# Patient Record
Sex: FEMALE | Race: WHITE | NOT HISPANIC OR LATINO | Employment: OTHER | URBAN - METROPOLITAN AREA
[De-identification: names, ages, dates, MRNs, and addresses within clinical notes are randomized per-mention and may not be internally consistent; named-entity substitution may affect disease eponyms.]

---

## 2023-02-23 LAB
ALANINE AMINOTRANSFERASE (SGPT) (U/L) IN SER/PLAS: 6 U/L (ref 7–45)
ALBUMIN (G/DL) IN SER/PLAS: 3.9 G/DL (ref 3.4–5)
ALKALINE PHOSPHATASE (U/L) IN SER/PLAS: 50 U/L (ref 33–110)
ANION GAP IN SER/PLAS: 10 MMOL/L (ref 10–20)
ASPARTATE AMINOTRANSFERASE (SGOT) (U/L) IN SER/PLAS: 9 U/L (ref 9–39)
BASOPHILS (10*3/UL) IN BLOOD BY AUTOMATED COUNT: 0.05 X10E9/L (ref 0–0.1)
BASOPHILS/100 LEUKOCYTES IN BLOOD BY AUTOMATED COUNT: 0.5 % (ref 0–2)
BILIRUBIN TOTAL (MG/DL) IN SER/PLAS: 0.3 MG/DL (ref 0–1.2)
CALCIDIOL (25 OH VITAMIN D3) (NG/ML) IN SER/PLAS: 11 NG/ML
CALCIUM (MG/DL) IN SER/PLAS: 9 MG/DL (ref 8.6–10.3)
CARBON DIOXIDE, TOTAL (MMOL/L) IN SER/PLAS: 26 MMOL/L (ref 21–32)
CHLORIDE (MMOL/L) IN SER/PLAS: 107 MMOL/L (ref 98–107)
CHOLESTEROL (MG/DL) IN SER/PLAS: 209 MG/DL (ref 0–199)
CHOLESTEROL IN HDL (MG/DL) IN SER/PLAS: 54.4 MG/DL
CHOLESTEROL/HDL RATIO: 3.8
COBALAMIN (VITAMIN B12) (PG/ML) IN SER/PLAS: 163 PG/ML (ref 211–911)
CREATININE (MG/DL) IN SER/PLAS: 0.83 MG/DL (ref 0.5–1.05)
EOSINOPHILS (10*3/UL) IN BLOOD BY AUTOMATED COUNT: 0.24 X10E9/L (ref 0–0.7)
EOSINOPHILS/100 LEUKOCYTES IN BLOOD BY AUTOMATED COUNT: 2.5 % (ref 0–6)
ERYTHROCYTE DISTRIBUTION WIDTH (RATIO) BY AUTOMATED COUNT: 14.2 % (ref 11.5–14.5)
ERYTHROCYTE MEAN CORPUSCULAR HEMOGLOBIN CONCENTRATION (G/DL) BY AUTOMATED: 31.2 G/DL (ref 32–36)
ERYTHROCYTE MEAN CORPUSCULAR VOLUME (FL) BY AUTOMATED COUNT: 92 FL (ref 80–100)
ERYTHROCYTES (10*6/UL) IN BLOOD BY AUTOMATED COUNT: 4.51 X10E12/L (ref 4–5.2)
GFR FEMALE: >90 ML/MIN/1.73M2
GLUCOSE (MG/DL) IN SER/PLAS: 65 MG/DL (ref 74–99)
HEMATOCRIT (%) IN BLOOD BY AUTOMATED COUNT: 41.3 % (ref 36–46)
HEMOGLOBIN (G/DL) IN BLOOD: 12.9 G/DL (ref 12–16)
IMMATURE GRANULOCYTES/100 LEUKOCYTES IN BLOOD BY AUTOMATED COUNT: 0.6 % (ref 0–0.9)
LDL: 132 MG/DL (ref 0–99)
LEUKOCYTES (10*3/UL) IN BLOOD BY AUTOMATED COUNT: 9.5 X10E9/L (ref 4.4–11.3)
LYMPHOCYTES (10*3/UL) IN BLOOD BY AUTOMATED COUNT: 2.62 X10E9/L (ref 1.2–4.8)
LYMPHOCYTES/100 LEUKOCYTES IN BLOOD BY AUTOMATED COUNT: 27.6 % (ref 13–44)
MONOCYTES (10*3/UL) IN BLOOD BY AUTOMATED COUNT: 0.54 X10E9/L (ref 0.1–1)
MONOCYTES/100 LEUKOCYTES IN BLOOD BY AUTOMATED COUNT: 5.7 % (ref 2–10)
NEUTROPHILS (10*3/UL) IN BLOOD BY AUTOMATED COUNT: 5.97 X10E9/L (ref 1.2–7.7)
NEUTROPHILS/100 LEUKOCYTES IN BLOOD BY AUTOMATED COUNT: 63.1 % (ref 40–80)
PLATELETS (10*3/UL) IN BLOOD AUTOMATED COUNT: 294 X10E9/L (ref 150–450)
POTASSIUM (MMOL/L) IN SER/PLAS: 4.5 MMOL/L (ref 3.5–5.3)
PROTEIN TOTAL: 6.7 G/DL (ref 6.4–8.2)
SODIUM (MMOL/L) IN SER/PLAS: 138 MMOL/L (ref 136–145)
THYROTROPIN (MIU/L) IN SER/PLAS BY DETECTION LIMIT <= 0.05 MIU/L: 1.48 MIU/L (ref 0.44–3.98)
TRIGLYCERIDE (MG/DL) IN SER/PLAS: 114 MG/DL (ref 0–149)
UREA NITROGEN (MG/DL) IN SER/PLAS: 13 MG/DL (ref 6–23)
VLDL: 23 MG/DL (ref 0–40)

## 2023-02-24 PROBLEM — G43.909 MIGRAINES: Status: ACTIVE | Noted: 2023-02-24

## 2023-02-24 PROBLEM — F31.9 BIPOLAR DEPRESSION (MULTI): Status: ACTIVE | Noted: 2023-02-24

## 2023-02-24 PROBLEM — S90.456A: Status: ACTIVE | Noted: 2023-02-24

## 2023-02-24 PROBLEM — F41.9 ANXIETY AND DEPRESSION: Status: ACTIVE | Noted: 2023-02-24

## 2023-02-24 PROBLEM — M25.561 RIGHT KNEE PAIN: Status: ACTIVE | Noted: 2023-02-24

## 2023-02-24 PROBLEM — M54.9 BACK PAIN: Status: ACTIVE | Noted: 2023-02-24

## 2023-02-24 PROBLEM — E53.8 VITAMIN B12 DEFICIENCY: Status: ACTIVE | Noted: 2023-02-24

## 2023-02-24 PROBLEM — G47.33 OBSTRUCTIVE SLEEP APNEA OF ADULT: Status: ACTIVE | Noted: 2023-02-24

## 2023-02-24 PROBLEM — E55.9 VITAMIN D DEFICIENCY: Status: ACTIVE | Noted: 2023-02-24

## 2023-02-24 PROBLEM — F32.A ANXIETY AND DEPRESSION: Status: ACTIVE | Noted: 2023-02-24

## 2023-02-24 PROBLEM — M79.604 RIGHT LEG PAIN: Status: ACTIVE | Noted: 2023-02-24

## 2023-02-24 LAB
ESTIMATED AVERAGE GLUCOSE FOR HBA1C: 105 MG/DL
HEMOGLOBIN A1C/HEMOGLOBIN TOTAL IN BLOOD: 5.3 %

## 2023-02-24 RX ORDER — MELOXICAM 15 MG/1
15 TABLET ORAL DAILY
COMMUNITY

## 2023-02-24 RX ORDER — CYCLOBENZAPRINE HCL 5 MG
5 TABLET ORAL 3 TIMES DAILY PRN
COMMUNITY
Start: 2016-04-01

## 2023-06-06 ENCOUNTER — APPOINTMENT (OUTPATIENT)
Dept: PRIMARY CARE | Facility: CLINIC | Age: 38
End: 2023-06-06
Payer: COMMERCIAL

## 2023-07-28 ENCOUNTER — APPOINTMENT (OUTPATIENT)
Dept: PRIMARY CARE | Facility: CLINIC | Age: 38
End: 2023-07-28
Payer: COMMERCIAL

## 2023-07-28 DIAGNOSIS — J32.9 RECURRENT SINUS INFECTIONS: Primary | ICD-10-CM

## 2023-10-05 ENCOUNTER — TELEMEDICINE (OUTPATIENT)
Dept: OTOLARYNGOLOGY | Facility: CLINIC | Age: 38
End: 2023-10-05
Payer: COMMERCIAL

## 2023-10-05 DIAGNOSIS — J34.2 NASAL SEPTAL DEVIATION: ICD-10-CM

## 2023-10-05 DIAGNOSIS — J34.89 NASAL OBSTRUCTION: ICD-10-CM

## 2023-10-05 DIAGNOSIS — J34.89 NASAL AND SINUS DISCHARGE: ICD-10-CM

## 2023-10-05 DIAGNOSIS — J32.4 CHRONIC PANSINUSITIS: Primary | ICD-10-CM

## 2023-10-05 PROCEDURE — 99213 OFFICE O/P EST LOW 20 MIN: CPT | Performed by: NURSE PRACTITIONER

## 2023-10-05 ASSESSMENT — PATIENT HEALTH QUESTIONNAIRE - PHQ9
SUM OF ALL RESPONSES TO PHQ9 QUESTIONS 1 AND 2: 0
1. LITTLE INTEREST OR PLEASURE IN DOING THINGS: NOT AT ALL
2. FEELING DOWN, DEPRESSED OR HOPELESS: NOT AT ALL

## 2023-10-05 NOTE — PATIENT INSTRUCTIONS
Today you were evaluated by Jeimy Chopra CNP.    Please follow-up as needed. If you have any questions or concerns, please contact my office at [phone].     Please follow-up directly with Dr. Cj Wu regarding surgery.

## 2023-10-05 NOTE — PROGRESS NOTES
Subjective   Patient ID: Shahla An is a 38 y.o. female who presents for follow-up visit.  HPI  38 year old female with symptoms of nasal obstruction, nasal drainage, anosmia and clinical findings consistent with chronic rhinosinusitis with nasal polyposis? She continues with purulence and large polypoid structures despite antibiotic therapy. Rec. additional antibiotic therapy, prednisone and imaging.  10/5/23- TELEHEALTH- Patient presents for follow-up. She notes that she had improvement to her symptoms while on the medication and felt that she could blow her nose. She is fully obstructed again since completion. She continues to use flonase and irrigations daily.     I personally reviewed the patients CT scan images and results. I discussed the results personally with the patient. The following findings were discussed: 9/28/23: CT Sinus: Left nasal septal deviation. Near complete opacification of the left maxillary sinus with obstruction of the left nasal cavity. Minimal mucosal thickening in the frontal sinuses. Ethmoid sinuses with minimal mucosal thickening. Near complete opacification of the left sphenoid sinus.    Review of Systems    Objective   Physical Exam  CONSTITUTIONAL: Patient appears well developed and well nourished.   GENERAL: this is a healthy appearing female who appears stated age. The patient is alert and appropriately verbally conversant without hoarseness. This patient is in no apparent distress.   FACE: The face was inspected and no cutaneous masses or lesions were visualized. There was no erythema or edema noted. Facial movement was symmetric.   EYES: Extra-ocular muscle function was intact. No nystagmus was observed. Pupils were equal.   NOSE: Examination of the nose revealed the nasal dorsum to be midline. NEUROLOGICAL: Patient is ambulatory without assist. Mentation is clear. Answering questions appropriately.   Assessment/Plan     ASSESSMENT:   38 year old female with symptoms  of nasal obstruction, nasal drainage, anosmia and clinical findings consistent with chronic rhinosinusitis with nasal polyposis? She continues with purulence and large polypoid structures despite antibiotic therapy. Rec. additional antibiotic therapy, prednisone and imaging.  10/5/23- TELEHEALTH- Surgical referral to Dr. Cj Wu.       PLAN:  1) I personally reviewed the patients CT scan images and results. I discussed the results personally with the patient. The following findings were discussed: 9/28/23: CT Sinus: Left nasal septal deviation. Near complete opacification of the left maxillary sinus with obstruction of the left nasal cavity. Minimal mucosal thickening in the frontal sinuses. Ethmoid sinuses with minimal mucosal thickening. Near complete opacification of the left sphenoid sinus.  Reassurance and counseling provided to patient and his condition was extensively discussed including as noted below:  2) Patient was instructed to continue steroid nasal spray.  3) The patient and myself had an extensive discussion regarding next steps for further management. We discussed endoscopic sinus surgery at length. We discussed implications for treatment. Patient referred for surgical management with Dr. Cj Wu. His office was contacted directly for scheduling.      11 minutes was spent on this patient's visit. More than 50% of that time was spent in counseling regarding the possible etiologies, test results, treatment options and coordinating care.
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
## 2023-12-06 NOTE — PATIENT INSTRUCTIONS
You are a good candidate for endoscopic sinus surgery, septoplasty, and bilateral inferior turbinate reductions. After your sinus surgery, you will need to do frequent saline irrigations. This will extremely important so that your sinuses stay open after surgery.    You were prescribed a 2-week course of doxycycline. Take it twice a day after breakfast and dinner.  In the middle of the day, take a daily probiotic or yogurt. Doxycycline can cause a skin sensitivity reaction with the sun. Avoid sun exposure while taking this medication or apply strong sunscreen (SPF-40 or higher) when in the sun. Please do not consume any dairy, calcium, or iron for 2 hours before or after taking this medication because this inactivates doxycycline. Common side effects from doxycycline include stomach upset and diarrhea. Take the antibiotic after meals to avoid these side effects. If you develop abdominal pain or diarrhea, please contact our office at [phone].    Please start a 12-day tapering course of prednisone. Take as directed. Please take the prednisone each day before noon. If you take the prednisone later in the day, it can cause difficulty sleeping.  The potential side effects of oral steroid use include but are not limited to: difficulty sleeping/insomnia, increased appetite, fluid retention, mood swings, weight gain, change in blood pressure, high blood glucose, possible adrenal suppression, osteoporosis, avascular necrosis of the hip, menstrual irregularities, glaucoma, and cataracts.    You have a top left tooth root that needs evaluation. I am concerned for possible dental related sources of infection (tooth root infection). Please see a dentist, endodontist, or a periodontist after your surgery.    PATIENT INFORMATION/INSTRUCTIONS PRIOR TO SINUS SURGERY: *Please Read Carefully*    Dr. Wu discussed the rationale for endoscopic sinus surgery, along with the benefits, risks, potential complications, and side  effects of the procedure with you today. If you have any questions about these subjects, please feel free to call our office at [phone].    You can expect after surgery to have increased symptoms and congestion for at least 1 week and sometimes upward of a month. Everyone's body reacts differently. You will have at least 2 visits with Dr. Wu for cleaning/debridement of the sinuses in the office after surgery, approximately 1 week and 1 month after surgery. This is necessary to ensure the sinuses heal well.    As described, your sinus surgery is only half the rey. Post operatively, it is VITAL that you continue the nasal irrigations and other medications directed by Dr. Wu. You will be given an instructional sheet post operatively that describes the expected disease course including nasal care. Dr. Wu will want you to start irrigations of the nose with saline post operatively. This is also important to keep the nose and sinuses open.    STOP TAKING THESE MEDICATIONS prior to surgery:  Aspirin - 10 to 14 days before surgery  Plavix/clopidogrel - 10 days before surgery  NSAIDs - 7 days before surgery (NSAIDs include painkillers like Motrin, Aleve, Naprosyn, Mobic, diclofenac, and ibuprofen)  Vitamin E - 10 to 14 days before surgery  Multivitamins with Vitamin E - 10 to 14 days before surgery  Herbal Medications/Diet Pills - 10 to 14 days before surgery    5.   Avoid NSAIDs for PAIN RELIEF. If needed, you could take Tylenol on the day of surgery with sips of water. You may also use opiates prescribed by your physician which includes medications like Percocet / MS Contin / Darvocet / Ultram.    6.   BLOOD THINNERS including Coumadin, Plavix, and Ticlid are to be stopped as directed by surgeon/cardiologist or as listed below.    7.   FOR PATIENTS WITH ASTHMA/COPD:  Use your inhalers as prescribed/as needed on the day of surgery. Bring your inhalers with you to the hospital. If you have Obstructive  Sleep Apnea and are on a CPAP/BiPAP machine, please bring it with you to the hospital.    8.   On the day of surgery, DO NOT wear jewelry, body piercings, makeup, nail polish, hairpins, or contacts. Leave all valuables and money with family members. If you have dentures, please leave these with family members.    9.   IF you are undergoing an OUTPATIENT procedure (Ambulatory Surgery - going home on the same day), you must have someone drive you home and stay with you for 24 hours after surgery. You cannot stay alone in a hotel room after outpatient surgery. Also, a  or  cannot be made a responsible caregiver. Your surgery may be cancelled if you do not have someone take care of you for 24 hours.    10.  You will be given a time to arrive the business day before surgery. If you do not hear about a time by 4:30 pm the business day before surgery, please call [phone] and ask for a time.    AFTER SURGERY, please observe the following precautions:  Refrain from blowing your nose for at least 2 weeks from the date of your surgery. Please do not stifle any sneezes. If you must sneeze, then try to sneeze through an open mouth. Please do not stick anything in your nose other than any medicine or irrigations we recommend. Please do not insert a Q-tip or finger in the nose after surgery as this can cause further trauma. Please refrain from any activities that will increase your heart rate or blood pressure for at least 2 weeks from the date of your surgery. Try to keep your head above your heart as much as possible. Please refrain from lifting objects greater than 10 lbs for at least 2 weeks from the date of your surgery.    Scribe Attestation  By signing my name below, I, Jia Murphy, attest that this documentation has been prepared under the direction and in the presence of Cj Wu MD. All medical record entries made by the Scribe were at my direction or personally dictated by me.  I have reviewed the chart and agree that the record accurately reflects my personal performance of the history, physical exam, discussion and plan.

## 2023-12-08 ENCOUNTER — OFFICE VISIT (OUTPATIENT)
Dept: OTOLARYNGOLOGY | Facility: CLINIC | Age: 38
End: 2023-12-08
Payer: COMMERCIAL

## 2023-12-08 VITALS — TEMPERATURE: 97.4 F | HEIGHT: 62 IN | BODY MASS INDEX: 53.07 KG/M2 | WEIGHT: 288.4 LBS

## 2023-12-08 DIAGNOSIS — J34.89 NASAL OBSTRUCTION: ICD-10-CM

## 2023-12-08 DIAGNOSIS — J32.4 CHRONIC PANSINUSITIS: ICD-10-CM

## 2023-12-08 DIAGNOSIS — J34.2 NASAL SEPTAL DEVIATION: ICD-10-CM

## 2023-12-08 DIAGNOSIS — J32.2 CHRONIC ETHMOIDAL SINUSITIS: ICD-10-CM

## 2023-12-08 DIAGNOSIS — R43.0 ANOSMIA: ICD-10-CM

## 2023-12-08 DIAGNOSIS — J32.0 CHRONIC MAXILLARY SINUSITIS: ICD-10-CM

## 2023-12-08 DIAGNOSIS — J32.3 CHRONIC SPHENOIDAL SINUSITIS: ICD-10-CM

## 2023-12-08 DIAGNOSIS — J34.89 NASAL AND SINUS DISCHARGE: ICD-10-CM

## 2023-12-08 DIAGNOSIS — J32.4 CHRONIC PANSINUSITIS: Primary | ICD-10-CM

## 2023-12-08 DIAGNOSIS — J34.3 HYPERTROPHY OF INFERIOR NASAL TURBINATE: ICD-10-CM

## 2023-12-08 PROCEDURE — 31231 NASAL ENDOSCOPY DX: CPT | Performed by: OTOLARYNGOLOGY

## 2023-12-08 PROCEDURE — 99214 OFFICE O/P EST MOD 30 MIN: CPT | Performed by: OTOLARYNGOLOGY

## 2023-12-08 RX ORDER — DOXYCYCLINE 100 MG/1
100 TABLET ORAL 2 TIMES DAILY
Qty: 20 TABLET | Refills: 0 | Status: SHIPPED | OUTPATIENT
Start: 2023-12-08 | End: 2023-12-18

## 2023-12-08 RX ORDER — PREDNISONE 10 MG/1
TABLET ORAL
Qty: 30 TABLET | Refills: 0 | Status: SHIPPED | OUTPATIENT
Start: 2023-12-08

## 2023-12-08 RX ORDER — FLUTICASONE PROPIONATE 50 MCG
2 SPRAY, SUSPENSION (ML) NASAL DAILY
COMMUNITY
Start: 2023-08-31

## 2023-12-08 ASSESSMENT — PATIENT HEALTH QUESTIONNAIRE - PHQ9
SUM OF ALL RESPONSES TO PHQ9 QUESTIONS 1 & 2: 0
2. FEELING DOWN, DEPRESSED OR HOPELESS: NOT AT ALL
1. LITTLE INTEREST OR PLEASURE IN DOING THINGS: NOT AT ALL
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
## 2023-12-15 DIAGNOSIS — J32.4 CHRONIC PANSINUSITIS: ICD-10-CM

## 2023-12-15 DIAGNOSIS — J34.2 DEVIATED NASAL SEPTUM: ICD-10-CM

## 2023-12-15 DIAGNOSIS — J34.89 NASAL AND SINUS DISCHARGE: ICD-10-CM

## 2023-12-15 DIAGNOSIS — J32.3 CHRONIC SPHENOIDAL SINUSITIS: ICD-10-CM

## 2023-12-15 DIAGNOSIS — J34.3 HYPERTROPHY OF NASAL TURBINATES: ICD-10-CM

## 2023-12-15 DIAGNOSIS — J32.0 CHRONIC MAXILLARY SINUSITIS: ICD-10-CM

## 2023-12-15 DIAGNOSIS — J32.2 CHRONIC ETHMOIDAL SINUSITIS: ICD-10-CM

## 2023-12-20 ENCOUNTER — APPOINTMENT (OUTPATIENT)
Dept: OTOLARYNGOLOGY | Facility: CLINIC | Age: 38
End: 2023-12-20
Payer: COMMERCIAL

## 2024-01-26 ENCOUNTER — APPOINTMENT (OUTPATIENT)
Dept: PREADMISSION TESTING | Facility: HOSPITAL | Age: 39
End: 2024-01-26
Payer: COMMERCIAL

## 2024-02-02 ENCOUNTER — APPOINTMENT (OUTPATIENT)
Dept: PREADMISSION TESTING | Facility: HOSPITAL | Age: 39
End: 2024-02-02
Payer: COMMERCIAL

## 2024-02-14 ENCOUNTER — PRE-ADMISSION TESTING (OUTPATIENT)
Dept: PREADMISSION TESTING | Facility: HOSPITAL | Age: 39
End: 2024-02-14
Payer: COMMERCIAL

## 2024-02-14 VITALS
BODY MASS INDEX: 53.18 KG/M2 | DIASTOLIC BLOOD PRESSURE: 84 MMHG | SYSTOLIC BLOOD PRESSURE: 146 MMHG | RESPIRATION RATE: 16 BRPM | HEIGHT: 62 IN | HEART RATE: 95 BPM | WEIGHT: 289 LBS | TEMPERATURE: 98.2 F

## 2024-02-14 DIAGNOSIS — J34.89 NASAL AND SINUS DISCHARGE: ICD-10-CM

## 2024-02-14 DIAGNOSIS — J32.3 CHRONIC SPHENOIDAL SINUSITIS: ICD-10-CM

## 2024-02-14 DIAGNOSIS — J34.3 HYPERTROPHY OF NASAL TURBINATES: ICD-10-CM

## 2024-02-14 DIAGNOSIS — J32.2 CHRONIC ETHMOIDAL SINUSITIS: ICD-10-CM

## 2024-02-14 DIAGNOSIS — J32.4 CHRONIC PANSINUSITIS: ICD-10-CM

## 2024-02-14 DIAGNOSIS — J34.2 DEVIATED NASAL SEPTUM: ICD-10-CM

## 2024-02-14 DIAGNOSIS — J32.0 CHRONIC MAXILLARY SINUSITIS: Primary | ICD-10-CM

## 2024-02-14 LAB
ABO GROUP (TYPE) IN BLOOD: NORMAL
ANION GAP SERPL CALC-SCNC: 12 MMOL/L (ref 10–20)
ANTIBODY SCREEN: NORMAL
BUN SERPL-MCNC: 14 MG/DL (ref 6–23)
CALCIUM SERPL-MCNC: 9.4 MG/DL (ref 8.6–10.6)
CHLORIDE SERPL-SCNC: 105 MMOL/L (ref 98–107)
CO2 SERPL-SCNC: 25 MMOL/L (ref 21–32)
CREAT SERPL-MCNC: 0.72 MG/DL (ref 0.5–1.05)
EGFRCR SERPLBLD CKD-EPI 2021: >90 ML/MIN/1.73M*2
ERYTHROCYTE [DISTWIDTH] IN BLOOD BY AUTOMATED COUNT: 14 % (ref 11.5–14.5)
GLUCOSE SERPL-MCNC: 89 MG/DL (ref 74–99)
HCT VFR BLD AUTO: 44.2 % (ref 36–46)
HGB BLD-MCNC: 14.6 G/DL (ref 12–16)
MCH RBC QN AUTO: 29 PG (ref 26–34)
MCHC RBC AUTO-ENTMCNC: 33 G/DL (ref 32–36)
MCV RBC AUTO: 88 FL (ref 80–100)
NRBC BLD-RTO: 0 /100 WBCS (ref 0–0)
PLATELET # BLD AUTO: 275 X10*3/UL (ref 150–450)
POTASSIUM SERPL-SCNC: 4.7 MMOL/L (ref 3.5–5.3)
RBC # BLD AUTO: 5.03 X10*6/UL (ref 4–5.2)
RH FACTOR (ANTIGEN D): NORMAL
SODIUM SERPL-SCNC: 137 MMOL/L (ref 136–145)
WBC # BLD AUTO: 10.2 X10*3/UL (ref 4.4–11.3)

## 2024-02-14 PROCEDURE — 36415 COLL VENOUS BLD VENIPUNCTURE: CPT

## 2024-02-14 PROCEDURE — 80048 BASIC METABOLIC PNL TOTAL CA: CPT

## 2024-02-14 PROCEDURE — 86901 BLOOD TYPING SEROLOGIC RH(D): CPT

## 2024-02-14 PROCEDURE — 99204 OFFICE O/P NEW MOD 45 MIN: CPT | Performed by: NURSE PRACTITIONER

## 2024-02-14 PROCEDURE — 85027 COMPLETE CBC AUTOMATED: CPT

## 2024-02-14 ASSESSMENT — ENCOUNTER SYMPTOMS
MUSCULOSKELETAL NEGATIVE: 1
CONSTITUTIONAL NEGATIVE: 1
NECK NEGATIVE: 1
NEUROLOGICAL NEGATIVE: 1
RESPIRATORY NEGATIVE: 1
SINUS CONGESTION: 1
GASTROINTESTINAL NEGATIVE: 1
RHINORRHEA: 1
CARDIOVASCULAR NEGATIVE: 1
ENDOCRINE NEGATIVE: 1

## 2024-02-14 ASSESSMENT — DUKE ACTIVITY SCORE INDEX (DASI)
CAN YOU PARTICIPATE IN MODERATE RECREATIONAL ACTIVITIES LIKE GOLF, BOWLING, DANCING, DOUBLES TENNIS OR THROWING A BASEBALL OR FOOTBALL: YES
CAN YOU PARTICIPATE IN STRENOUS SPORTS LIKE SWIMMING, SINGLES TENNIS, FOOTBALL, BASKETBALL, OR SKIING: YES
TOTAL_SCORE: 58.2
CAN YOU DO MODERATE WORK AROUND THE HOUSE LIKE VACUUMING, SWEEPING FLOORS OR CARRYING GROCERIES: YES
CAN YOU RUN A SHORT DISTANCE: YES
CAN YOU CLIMB A FLIGHT OF STAIRS OR WALK UP A HILL: YES
CAN YOU TAKE CARE OF YOURSELF (EAT, DRESS, BATHE, OR USE TOILET): YES
CAN YOU WALK A BLOCK OR TWO ON LEVEL GROUND: YES
CAN YOU WALK INDOORS, SUCH AS AROUND YOUR HOUSE: YES
CAN YOU DO HEAVY WORK AROUND THE HOUSE LIKE SCRUBBING FLOORS OR LIFTING AND MOVING HEAVY FURNITURE: YES
CAN YOU DO YARD WORK LIKE RAKING LEAVES, WEEDING OR PUSHING A MOWER: YES
CAN YOU DO LIGHT WORK AROUND THE HOUSE LIKE DUSTING OR WASHING DISHES: YES
CAN YOU HAVE SEXUAL RELATIONS: YES
DASI METS SCORE: 9.9

## 2024-02-14 ASSESSMENT — CHADS2 SCORE
DIABETES: NO
CHF: NO
AGE GREATER THAN OR EQUAL TO 75: NO
HYPERTENSION: NO
PRIOR STROKE OR TIA OR THROMBOEMBOLISM: YES
CHADS2 SCORE: 2

## 2024-02-14 ASSESSMENT — LIFESTYLE VARIABLES: SMOKING_STATUS: SMOKER

## 2024-02-14 NOTE — CPM/PAT H&P
CPM/PAT Evaluation       Name: Shahla An (Shahla An)  /Age: 1985/39 y.o.     Visit Type:   In-Person       Chief Complaint: Chronic maxillary sinusitis,  Chronic ethmoidal sinusitis, Chronic sphenoidal sinusitis,  Chronic pansinusitis, Deviated nasal septum, Hypertrophy of nasal turbinates, Nasal and sinus discharge   HPI  39-year-old female with a past medical history significant for morbid obesity, today noncompliant with CPAP, childhood asthma, hearing loss, nephrolithiasis, TIA, anxiety, depression, bipolar disorder, osteoarthritis and chronic rhinosinusitis with nasal polyposis. Patient is being evaluated in CPM in anticipation of endoscopic sinus surgery, septoplasty, inferior turbinate submucous resection with IGS, nasal endoscopy with excision of maxillary sinus tissue, repair of nasal septum and excision of nasal turbinate with Dr. Flanagan 24.  Past Medical History:   Diagnosis Date    Anxiety     Arthritis     Asthma     childhood    Bipolar disorder (CMS/HCC)     Cyst of nasal cavity     right    Depression     HL (hearing loss)     Hypoglycemia     Nasal polyps     Nephrolithiasis     PTSD (post-traumatic stress disorder)     Sinusitis     Sleep apnea     CPAP-noncompliant    TIA (transient ischemic attack)      or     Vision loss     glasses       Past Surgical History:   Procedure Laterality Date    ADENOIDECTOMY       SECTION, CLASSIC      2    ORIF TIBIA & FIBULA FRACTURES      TONSILLECTOMY         Patient Sexual activity questions deferred to the physician.    Family History   Problem Relation Name Age of Onset    Diabetes Maternal Grandmother      Heart disease Paternal Grandmother      Diabetes Paternal Grandmother      Brain cancer Paternal Grandfather         Allergies   Allergen Reactions    Latex Itching    Morphine Hives and Itching    Vicodin [Hydrocodone-Acetaminophen] Unknown       Prior to Admission medications    Medication Sig Start Date  End Date Taking? Authorizing Provider   cyclobenzaprine (Flexeril) 5 mg tablet Take 1 tablet (5 mg) by mouth 3 times a day as needed. 4/1/16  Yes Historical Provider, MD   fluticasone (Flonase) 50 mcg/actuation nasal spray Administer 2 sprays into each nostril once daily. SHAKE WELL BEFORE USING 8/31/23  Yes Historical Provider, MD   meloxicam (Mobic) 15 mg tablet Take 1 tablet (15 mg) by mouth once daily. With food   Yes Historical Provider, MD   predniSONE (Deltasone) 10 mg tablet 40mg PO for 3 days, 30mg PO Qday for 3 days, 20mg PO Qday for 3 days, 10 mg PO Qday for 3 days, then stop. 12/8/23  Yes Cj Wu MD        PAT ROS:   Constitutional:   neg    Neuro/Psych:   neg    Eyes:    use of corrective lenses  Ears:    hearing loss   tinnitus  Nose:    nasal discharge   sinus congestion  Mouth:   neg    Throat:   neg    Neck:   neg    Cardio:   neg    Respiratory:   neg    Endocrine:   neg    GI:   neg    :   neg    Musculoskeletal:   neg    Hematologic:   neg    Skin:  neg        Physical Exam  Constitutional:       Appearance: Normal appearance. She is obese.   HENT:      Head: Normocephalic and atraumatic.      Nose: Congestion present.      Mouth/Throat:      Mouth: Mucous membranes are moist.   Cardiovascular:      Rate and Rhythm: Normal rate and regular rhythm.      Pulses: Normal pulses.      Heart sounds: Normal heart sounds.   Pulmonary:      Effort: Pulmonary effort is normal.      Breath sounds: Normal breath sounds.   Abdominal:      Palpations: Abdomen is soft.   Musculoskeletal:         General: Normal range of motion.      Cervical back: Normal range of motion.   Skin:     General: Skin is warm.   Neurological:      General: No focal deficit present.      Mental Status: She is alert and oriented to person, place, and time.   Psychiatric:         Mood and Affect: Mood normal.         Behavior: Behavior normal.          PAT AIRWAY:   Airway:     Mallampati::  IV    TM distance::  >3 FB    Neck  ROM::  Full   Missing teeth      Visit Vitals  /84   Pulse 95   Temp 36.8 °C (98.2 °F)   Resp 16       DASI Risk Score      Flowsheet Row Most Recent Value   DASI SCORE 58.2   METS Score (Will be calculated only when all the questions are answered) 9.9          Caprini DVT Assessment      Flowsheet Row Most Recent Value   DVT Score 9   Current Status Major surgery planned, lasting over 3 hours   History Prior major surgery   Age Less than 40 years   BMI Greater than 50 (Venous stasis syndrome)          Modified Frailty Index      Flowsheet Row Most Recent Value   Modified Frailty Index Calculator .0909          CHADS2 Stroke Risk  Current as of just now        N/A 3 - 100%: High Risk   2 - 3%: Medium Risk   0 - 2%: Low Risk     Last Change: N/A          This score determines the patient's risk of having a stroke if the patient has atrial fibrillation.        This score is not applicable to this patient. Components are not calculated.          Revised Cardiac Risk Index      Flowsheet Row Most Recent Value   Revised Cardiac Risk Calculator 1          Apfel Simplified Score      Flowsheet Row Most Recent Value   Apfel Simplified Score Calculator 3          Risk Analysis Index Results This Encounter    No data found in the last 1 encounters.       Stop Bang Score      Flowsheet Row Most Recent Value   Do you snore loudly? 1   Do you often feel tired or fatigued after your sleep? 1   Has anyone ever observed you stop breathing in your sleep? 1   Do you have or are you being treated for high blood pressure? 0   Recent BMI (Calculated) 52.9   Is BMI greater than 35 kg/m2? 1=Yes   Age older than 50 years old? 0=No   Is your neck circumference greater than 17 inches (Male) or 16 inches (Female)? 1   Gender - Male 0=No   STOP-BANG Total Score 5            Assessment and Plan:     Anesthesia:  The patient denies problems with anesthesia in the past such as PONV, prolonged sedation, awareness, dental damage, aspiration,  cardiac arrest, difficult intubation, or unexpected hospital admissions.     Neuro:   The patient has no neurological diagnoses or significant findings on chart review, clinical presentation, and evaluation.  No grossly apparent perioperative risk. The patient is at increased risk for perioperative stroke secondary to female gender, general anesthesia, operative time >2.5 hours.    HEENT/Airway  No diagnoses, significant findings on chart review, clinical presentation, or evaluation.    Cardiovascular  The patient is scheduled for non-cardiac surgery associated with elevated risk.  The patient has no major cardiac contraindications to non- cardiac surgery.  RCRI  The patient meets 0-1 RCRI criteria and therefore has a less than 1% risk of major adverse cardiac complications.  METS  The patient's functional capacity capacity is greater than 4 METS.  EKG  The patient has no EKG or echocardiographic changes concerning for myocardial ischemia.   Heart Failure  The patient has no known history of heart failure.  Additionally, the patient reports no symptoms of heart failure and demonstrates no signs of heart failure.  Hypertension Evaluation  The patient has no known history of hypertension and has a normal blood pressure today.  Heart Rhythm Evaluation  The patient has no history of arrhythmias.  Heart Valve Evaluation  The patient has no known history of valvular heart disease. The patient has no symptoms or physical exam findings to suggest valvular heart disease.  CARDS EVAL  The patient is not followed by cardiology.  The patient has a 30-day risk for MACE of 0 predictors, 3.9% risk for cardiac death, nonfatal myocardial infarction, and nonfatal cardiac arrest.  NIMESH score which indicates a 0.11% risk of intraoperative or 30-day postoperative.    Pulmonary   The patient has findings on chart review, clinical presentation and evaluation significant for VALERIE. The patient is at increased risk of perioperative pulmonary  complications secondary to ongoing tobacco abuse, morbid obesity.  The patient has a stop bang score of 5, which places patient at high risk for having VALERIE.    ARISCAT 23, low, 1.6% risk of in-hospital postoperative pulmonary complications  PRODIGY 5, low risk of respiratory depression episode.    Hematology  No diagnoses or significant findings on chart review or clinical presentation and evaluation.  Antiplatelet management   The patient is not currently receiving antiplatelet therapy.  Anticoagulation management  The patient is not currently receiving anticoagulation therapy.  Caprini score 9, high risk of perioperative VTE  Transfusion Evaluation  A type and screen was obtained given the likelihood for perioperative transfusion of blood or blood products.    Gastrointestinal  No diagnoses or significant findings on chart review or clinical presentation and evaluation.  Eat 10- 0,  self-perceived oropharyngeal dysphagia scale (0-40)     Genitourinary  No diagnoses or significant findings on chart review or clinical presentation and evaluation.    Renal  No renal diagnoses or significant findings on chart review or clinical presentation and evaluation.    Musculoskeletal  No diagnoses or significant findings on chart review or clinical presentation and evaluation.    Endocrine  Diabetes Evaluation  The patient has no history of diabetes mellitus  Thyroid Disease Evaluation  The patient has no history of thyroid disease.    ID  No diagnoses or significant findings on chart review or clinical presentation and evaluation.    -Preoperative medication instructions were provided and reviewed with the patient.  Any additional testing or evaluation was explained to the patient.  NPO Instructions were discussed, and the patient's questions were answered prior to conclusion of this encounter

## 2024-02-14 NOTE — PREPROCEDURE INSTRUCTIONS
NPO Instructions:    Do not eat any food after midnight the night before your surgery/procedure.  You may have clear liquids until TWO hours before surgery/procedure. This includes water, black tea/coffee, (no milk or cream) apple juice and electrolyte drinks (Gatorade).  You may chew gum up to TWO hours before your surgery/procedure.    Additional Instructions:     Seven/Six Days before Surgery:  Review your medication instructions, stop indicated medications  Five Days before Surgery:  Review your medication instructions, stop indicated medications  Three Days before Surgery:  Review your medication instructions, stop indicated medications  The Day before Surgery:  Review your medication instructions, stop indicated medications  You will be contacted regarding the time of your arrival to facility and surgery time  Do not eat any food after Midnight  Day of Surgery:  Review your medication instructions, take indicated medications  You may have clear liquids until TWO hours before surgery/procedure.  This includes water, black tea/coffee, (no milk or cream) apple juice and electrolyte drinks (Gatorade)  You may chew gum up to TWO hours before your surgery/procedure  Wear  comfortable loose fitting clothing  Do not use moisturizers, creams, lotions or perfume  All jewelry and valuables should be left at home

## 2024-02-19 ENCOUNTER — ANESTHESIA EVENT (OUTPATIENT)
Dept: OPERATING ROOM | Facility: HOSPITAL | Age: 39
End: 2024-02-19
Payer: COMMERCIAL

## 2024-02-19 NOTE — DISCHARGE INSTRUCTIONS
Nasal and Sinus Surgery at Home Instructions  The following instructions will help you know what to expect in the days following your surgery.     Please have the following items available at your home/recovery residence:  · NeilMed Sinus Rinse® bottle or equivalent for post-surgical nasal irrigations  · Oxymetazoline (Afrin®) or Phenylephrine (Torin-Synephrine®) are topical decongestant sprays   - they should ONLY be used if you have a nosebleed or excessive bleeding  · 4 x 4 gauze and paper tape  · Tylenol® (adjunct to prescription therapy or as sole pain medication)    Activities  · You may shower the same day as your surgery   · Avoid sneezing or blowing your nose for 2 weeks after surgery / if you do sneeze, do so with your mouth open  · Avoid strenuous activity for 2 weeks after surgery / do not lift heavy objects (greater than 10 pounds) for 1 week after surgery  · Walking is strongly encouraged after surgery   · Most patients return to work without about 5 to 7 days after surgery but this is variable    Diet  · You can resume a normal diet within 24 hours of the surgery      Fever  · A low-grade fever, less than 101° F is not uncommon for 2 to 3 days after surgery. If fever continues or is higher than 101° F, call your physician.  You can use Tylenol® to control the fever.      Pain Control  · Pain is variable after nasal surgery but is generally well controlled with pain medication.  Most patients feel pressure and congestion.  Pain should lessen with time. If pain increases, call our office.  · For mild pain, acetaminophen (Tylenol®) is recommended.  We suggest scheduling 500-1000 mg of acetaminophen every 6 hours for the first several days (unless you are allergic to this medicine or have problems with your liver).  Do not take more than 4000mg in a day.  We will also prescribe stronger pain medication for after surgery. Drink plenty of water as these stronger pain medications can be constipating.  We  also recommend that you take stool softeners on a regular basis while taking narcotic pain medication.    Drainage  · You can expect to have bloody nasal drainage after nasal or sinus surgery. To catch   this drainage and to help avoid continuous nose wiping, we usually put a gauze “mustache” dressing under the nose and tape it to the cheeks for as long as the drainage continues.    · BLEEDING: Some blood in the nasal drainage after surgery is expected. This may be red, dark red or brown. One way to monitor this is to tape a piece of gauze under the nose to collect the bloody drainage. This may saturate with blood every 1 to 2 hours for the first few days after surgery. If it saturates completely with blood (blood dripping off of it and totally red) MORE FREQUENTLY THAN EVERY 30 MINUTES then call our office. Avoid nose blowing and try to sneeze with your mouth open.  Sleeping with your head elevated for at least the first night will also help prevent bleeding and reduce congestion. If you have a nosebleed, try spraying a topical decongestant spray (see page 1) into the side of bleeding 2-3 sprays and hold gentle pressure for 10 minutes.  If the bleeding continues after this is done twice call our office.       Care of the Nose  ·   NASAL SALINE IRRIGATION: THIS IS VERY IMPORTANT - Please START IRRIGATIONS THE MORNING AFTER SURGERY. After sinus surgery, we like to have the nose irrigated with a NeilMed Sinus Rinse® bottle (irrigation instructions and saline solution recipe are listed). This will help rinse the blood clots and mucous from the nose.   We recommend doing your nasal irrigations AT LEAST 3 TIMES A DAY UNTIL YOUR FOLLOW UP WITH YOUR SURGEON. IF YOU CAN DO IT 5-6 TIMES A DAY THAT WOULD BE IDEAL.  · Try not to manipulate your nose with your fingers     How to Irrigate  · Fill the NeilMed Sinus Rinse® bottle with the room temperature saline solution (see below for recipe). Gently insert the tip into one  nostril and squeeze. Keep your head down and blow out through your mouth when you irrigate to prevent water from going back down into your throat. Use about one half of the bottle per nostril. Do this for each nostril at least three times daily until your follow up with your surgeon. The more you can do the irrigation the better. You will likely be irrigating regularly for at least a month or two.    Nasal Irrigation Solution Recipe  · 8 ounces of room temperature distilled water. (If you use tap water, boil it first and let it cool to room temperature.)  · ½ teaspoon of table salt  · ½ teaspoon of baking soda  You can use the NeilMed Sinus Rinse® solution packets if preferred    Follow up  Your appointment for follow up after your surgery should have been scheduled at the time of your surgery. If not, call the office for an appointment scheduled for 1-2 weeks after the date of your surgery.    Call the office or GO TO THE EMERGENCY ROOM if you have:  · Excessive pain, swelling, bleeding or drainage  · Shortness of breath or difficulty breathing  · Persistent nausea and vomiting  · Fever that continues or is higher than 101° F  · Neck stiffness (cannot touch your chin to your chest)  · Confusion  · Worsening headaches that do not respond to pain medication  · Reproducible clear drainage dripping from your nose like a leaky faucet (particularly one sided).  Note:  This may happen and is normal up to 30 minutes following saline irrigations or sprays but if it is reproducible despite stopping saline please contact our office   · Salty or metallic taste (note that you may experience a salty taste that is normal up to 30 minutes following saline use)    Do not hesitate to call if you have any questions or concerns:  Offices of Otolaryngology - Division of Rhinology  Dr. Wu [phone] - Dr. Livingston [phone]   Normal office hours are:  8am-4pm Monday - Friday   If there is an after-hours EMERGENCY related to  your procedure please call [phone] (ask for the “ENT resident on-call”).

## 2024-02-20 ENCOUNTER — HOSPITAL ENCOUNTER (OUTPATIENT)
Facility: HOSPITAL | Age: 39
Setting detail: OUTPATIENT SURGERY
Discharge: HOME | End: 2024-02-20
Attending: OTOLARYNGOLOGY | Admitting: OTOLARYNGOLOGY
Payer: COMMERCIAL

## 2024-02-20 ENCOUNTER — ANESTHESIA (OUTPATIENT)
Dept: OPERATING ROOM | Facility: HOSPITAL | Age: 39
End: 2024-02-20
Payer: COMMERCIAL

## 2024-02-20 VITALS
RESPIRATION RATE: 13 BRPM | TEMPERATURE: 97.5 F | DIASTOLIC BLOOD PRESSURE: 87 MMHG | OXYGEN SATURATION: 96 % | SYSTOLIC BLOOD PRESSURE: 168 MMHG | HEART RATE: 75 BPM

## 2024-02-20 DIAGNOSIS — J34.3 HYPERTROPHY OF NASAL TURBINATES: ICD-10-CM

## 2024-02-20 DIAGNOSIS — J32.2 CHRONIC ETHMOIDAL SINUSITIS: ICD-10-CM

## 2024-02-20 DIAGNOSIS — J34.89 NASAL AND SINUS DISCHARGE: ICD-10-CM

## 2024-02-20 DIAGNOSIS — J32.0 CHRONIC MAXILLARY SINUSITIS: ICD-10-CM

## 2024-02-20 DIAGNOSIS — J34.2 DEVIATED NASAL SEPTUM: ICD-10-CM

## 2024-02-20 DIAGNOSIS — J32.3 CHRONIC SPHENOIDAL SINUSITIS: ICD-10-CM

## 2024-02-20 DIAGNOSIS — G89.18 POST-OP PAIN: ICD-10-CM

## 2024-02-20 DIAGNOSIS — J32.4 CHRONIC PANSINUSITIS: Primary | ICD-10-CM

## 2024-02-20 PROCEDURE — 30140 RESECT INFERIOR TURBINATE: CPT | Performed by: OTOLARYNGOLOGY

## 2024-02-20 PROCEDURE — 3700000002 HC GENERAL ANESTHESIA TIME - EACH INCREMENTAL 1 MINUTE: Performed by: OTOLARYNGOLOGY

## 2024-02-20 PROCEDURE — A4217 STERILE WATER/SALINE, 500 ML: HCPCS | Mod: SE | Performed by: OTOLARYNGOLOGY

## 2024-02-20 PROCEDURE — A30520 PR REPAIR OF NASAL SEPTUM: Performed by: ANESTHESIOLOGY

## 2024-02-20 PROCEDURE — 7100000001 HC RECOVERY ROOM TIME - INITIAL BASE CHARGE: Performed by: OTOLARYNGOLOGY

## 2024-02-20 PROCEDURE — A30520 PR REPAIR OF NASAL SEPTUM

## 2024-02-20 PROCEDURE — 2500000004 HC RX 250 GENERAL PHARMACY W/ HCPCS (ALT 636 FOR OP/ED): Mod: SE | Performed by: ANESTHESIOLOGY

## 2024-02-20 PROCEDURE — 3700000001 HC GENERAL ANESTHESIA TIME - INITIAL BASE CHARGE: Performed by: OTOLARYNGOLOGY

## 2024-02-20 PROCEDURE — 7100000010 HC PHASE TWO TIME - EACH INCREMENTAL 1 MINUTE: Performed by: OTOLARYNGOLOGY

## 2024-02-20 PROCEDURE — 7100000009 HC PHASE TWO TIME - INITIAL BASE CHARGE: Performed by: OTOLARYNGOLOGY

## 2024-02-20 PROCEDURE — 2500000005 HC RX 250 GENERAL PHARMACY W/O HCPCS: Mod: SE | Performed by: OTOLARYNGOLOGY

## 2024-02-20 PROCEDURE — 2500000004 HC RX 250 GENERAL PHARMACY W/ HCPCS (ALT 636 FOR OP/ED): Mod: SE

## 2024-02-20 PROCEDURE — 3600000003 HC OR TIME - INITIAL BASE CHARGE - PROCEDURE LEVEL THREE: Performed by: OTOLARYNGOLOGY

## 2024-02-20 PROCEDURE — 3600000008 HC OR TIME - EACH INCREMENTAL 1 MINUTE - PROCEDURE LEVEL THREE: Performed by: OTOLARYNGOLOGY

## 2024-02-20 PROCEDURE — 30520 REPAIR OF NASAL SEPTUM: CPT | Performed by: OTOLARYNGOLOGY

## 2024-02-20 PROCEDURE — 31254 NSL/SINS NDSC W/PRTL ETHMDCT: CPT | Performed by: OTOLARYNGOLOGY

## 2024-02-20 PROCEDURE — 31259 NSL/SINS NDSC SPHN TISS RMVL: CPT | Performed by: OTOLARYNGOLOGY

## 2024-02-20 PROCEDURE — 2500000001 HC RX 250 WO HCPCS SELF ADMINISTERED DRUGS (ALT 637 FOR MEDICARE OP): Mod: SE | Performed by: ANESTHESIOLOGY

## 2024-02-20 PROCEDURE — 2500000004 HC RX 250 GENERAL PHARMACY W/ HCPCS (ALT 636 FOR OP/ED): Mod: SE | Performed by: NURSE ANESTHETIST, CERTIFIED REGISTERED

## 2024-02-20 PROCEDURE — 2500000004 HC RX 250 GENERAL PHARMACY W/ HCPCS (ALT 636 FOR OP/ED): Mod: SE | Performed by: OTOLARYNGOLOGY

## 2024-02-20 PROCEDURE — 88304 TISSUE EXAM BY PATHOLOGIST: CPT | Performed by: PATHOLOGY

## 2024-02-20 PROCEDURE — 61782 SCAN PROC CRANIAL EXTRA: CPT | Performed by: OTOLARYNGOLOGY

## 2024-02-20 PROCEDURE — 2720000007 HC OR 272 NO HCPCS: Performed by: OTOLARYNGOLOGY

## 2024-02-20 PROCEDURE — 31267 ENDOSCOPY MAXILLARY SINUS: CPT | Performed by: OTOLARYNGOLOGY

## 2024-02-20 PROCEDURE — 88305 TISSUE EXAM BY PATHOLOGIST: CPT | Performed by: PATHOLOGY

## 2024-02-20 PROCEDURE — 2500000001 HC RX 250 WO HCPCS SELF ADMINISTERED DRUGS (ALT 637 FOR MEDICARE OP): Mod: SE | Performed by: OTOLARYNGOLOGY

## 2024-02-20 PROCEDURE — 0753T DGTZ GLS MCRSCP SLD LEVEL IV: CPT | Mod: TC,SUR | Performed by: OTOLARYNGOLOGY

## 2024-02-20 PROCEDURE — 7100000002 HC RECOVERY ROOM TIME - EACH INCREMENTAL 1 MINUTE: Performed by: OTOLARYNGOLOGY

## 2024-02-20 PROCEDURE — 2500000005 HC RX 250 GENERAL PHARMACY W/O HCPCS: Mod: SE

## 2024-02-20 PROCEDURE — 2500000002 HC RX 250 W HCPCS SELF ADMINISTERED DRUGS (ALT 637 FOR MEDICARE OP, ALT 636 FOR OP/ED): Mod: SE | Performed by: ANESTHESIOLOGY

## 2024-02-20 RX ORDER — HYDRALAZINE HYDROCHLORIDE 20 MG/ML
5 INJECTION INTRAMUSCULAR; INTRAVENOUS ONCE
Status: COMPLETED | OUTPATIENT
Start: 2024-02-20 | End: 2024-02-20

## 2024-02-20 RX ORDER — ESMOLOL HYDROCHLORIDE 10 MG/ML
INJECTION INTRAVENOUS AS NEEDED
Status: DISCONTINUED | OUTPATIENT
Start: 2024-02-20 | End: 2024-02-20

## 2024-02-20 RX ORDER — HYDROMORPHONE HYDROCHLORIDE 1 MG/ML
0.5 INJECTION, SOLUTION INTRAMUSCULAR; INTRAVENOUS; SUBCUTANEOUS EVERY 5 MIN PRN
Status: COMPLETED | OUTPATIENT
Start: 2024-02-20 | End: 2024-02-20

## 2024-02-20 RX ORDER — LIDOCAINE HYDROCHLORIDE AND EPINEPHRINE 10; 10 MG/ML; UG/ML
INJECTION, SOLUTION INFILTRATION; PERINEURAL AS NEEDED
Status: DISCONTINUED | OUTPATIENT
Start: 2024-02-20 | End: 2024-02-20 | Stop reason: HOSPADM

## 2024-02-20 RX ORDER — OXYCODONE HYDROCHLORIDE 5 MG/1
5 TABLET ORAL EVERY 4 HOURS PRN
Status: DISCONTINUED | OUTPATIENT
Start: 2024-02-20 | End: 2024-02-20 | Stop reason: HOSPADM

## 2024-02-20 RX ORDER — DEXAMETHASONE SODIUM PHOSPHATE 100 MG/10ML
INJECTION INTRAMUSCULAR; INTRAVENOUS AS NEEDED
Status: DISCONTINUED | OUTPATIENT
Start: 2024-02-20 | End: 2024-02-20

## 2024-02-20 RX ORDER — ACETAMINOPHEN 325 MG/1
650 TABLET ORAL EVERY 4 HOURS PRN
Status: DISCONTINUED | OUTPATIENT
Start: 2024-02-20 | End: 2024-02-20 | Stop reason: HOSPADM

## 2024-02-20 RX ORDER — TRAMADOL HYDROCHLORIDE 50 MG/1
50 TABLET ORAL EVERY 6 HOURS PRN
Qty: 20 TABLET | Refills: 0 | Status: SHIPPED | OUTPATIENT
Start: 2024-02-20 | End: 2024-02-25

## 2024-02-20 RX ORDER — LIDOCAINE HCL/PF 100 MG/5ML
SYRINGE (ML) INTRAVENOUS AS NEEDED
Status: DISCONTINUED | OUTPATIENT
Start: 2024-02-20 | End: 2024-02-20

## 2024-02-20 RX ORDER — LIDOCAINE HYDROCHLORIDE 10 MG/ML
0.1 INJECTION INFILTRATION; PERINEURAL ONCE
Status: DISCONTINUED | OUTPATIENT
Start: 2024-02-20 | End: 2024-02-20 | Stop reason: HOSPADM

## 2024-02-20 RX ORDER — DOXYCYCLINE 100 MG/1
100 TABLET ORAL 2 TIMES DAILY
Qty: 20 TABLET | Refills: 0 | Status: SHIPPED | OUTPATIENT
Start: 2024-02-20 | End: 2024-03-01

## 2024-02-20 RX ORDER — HYDROMORPHONE HYDROCHLORIDE 1 MG/ML
0.2 INJECTION, SOLUTION INTRAMUSCULAR; INTRAVENOUS; SUBCUTANEOUS EVERY 5 MIN PRN
Status: DISCONTINUED | OUTPATIENT
Start: 2024-02-20 | End: 2024-02-20 | Stop reason: HOSPADM

## 2024-02-20 RX ORDER — HYDROMORPHONE HYDROCHLORIDE 1 MG/ML
INJECTION, SOLUTION INTRAMUSCULAR; INTRAVENOUS; SUBCUTANEOUS AS NEEDED
Status: DISCONTINUED | OUTPATIENT
Start: 2024-02-20 | End: 2024-02-20

## 2024-02-20 RX ORDER — REMIFENTANIL HYDROCHLORIDE 1 MG/ML
INJECTION, POWDER, LYOPHILIZED, FOR SOLUTION INTRAVENOUS AS NEEDED
Status: DISCONTINUED | OUTPATIENT
Start: 2024-02-20 | End: 2024-02-20

## 2024-02-20 RX ORDER — ROCURONIUM BROMIDE 10 MG/ML
INJECTION, SOLUTION INTRAVENOUS AS NEEDED
Status: DISCONTINUED | OUTPATIENT
Start: 2024-02-20 | End: 2024-02-20

## 2024-02-20 RX ORDER — AMOXICILLIN 250 MG
1 CAPSULE ORAL DAILY
Qty: 10 TABLET | Refills: 0 | Status: SHIPPED | OUTPATIENT
Start: 2024-02-20 | End: 2024-03-01

## 2024-02-20 RX ORDER — OXYMETAZOLINE HCL 0.05 %
SPRAY, NON-AEROSOL (ML) NASAL AS NEEDED
Status: DISCONTINUED | OUTPATIENT
Start: 2024-02-20 | End: 2024-02-20 | Stop reason: HOSPADM

## 2024-02-20 RX ORDER — SODIUM CHLORIDE, SODIUM LACTATE, POTASSIUM CHLORIDE, CALCIUM CHLORIDE 600; 310; 30; 20 MG/100ML; MG/100ML; MG/100ML; MG/100ML
50 INJECTION, SOLUTION INTRAVENOUS CONTINUOUS
Status: DISCONTINUED | OUTPATIENT
Start: 2024-02-20 | End: 2024-02-20 | Stop reason: HOSPADM

## 2024-02-20 RX ORDER — ACETAMINOPHEN 500 MG
1000 TABLET ORAL EVERY 8 HOURS PRN
Qty: 90 TABLET | Refills: 0 | Status: SHIPPED | OUTPATIENT
Start: 2024-02-20 | End: 2024-03-06

## 2024-02-20 RX ORDER — LABETALOL HYDROCHLORIDE 5 MG/ML
INJECTION, SOLUTION INTRAVENOUS AS NEEDED
Status: DISCONTINUED | OUTPATIENT
Start: 2024-02-20 | End: 2024-02-20

## 2024-02-20 RX ORDER — MIDAZOLAM HYDROCHLORIDE 1 MG/ML
INJECTION INTRAMUSCULAR; INTRAVENOUS AS NEEDED
Status: DISCONTINUED | OUTPATIENT
Start: 2024-02-20 | End: 2024-02-20

## 2024-02-20 RX ORDER — ONDANSETRON HYDROCHLORIDE 2 MG/ML
4 INJECTION, SOLUTION INTRAVENOUS ONCE AS NEEDED
Status: COMPLETED | OUTPATIENT
Start: 2024-02-20 | End: 2024-02-20

## 2024-02-20 RX ORDER — ONDANSETRON HYDROCHLORIDE 2 MG/ML
INJECTION, SOLUTION INTRAVENOUS AS NEEDED
Status: DISCONTINUED | OUTPATIENT
Start: 2024-02-20 | End: 2024-02-20

## 2024-02-20 RX ORDER — SCOLOPAMINE TRANSDERMAL SYSTEM 1 MG/1
PATCH, EXTENDED RELEASE TRANSDERMAL AS NEEDED
Status: DISCONTINUED | OUTPATIENT
Start: 2024-02-20 | End: 2024-02-20

## 2024-02-20 RX ORDER — SODIUM CHLORIDE 0.65 %
2 AEROSOL, SPRAY (ML) NASAL EVERY 4 HOURS
Qty: 44 ML | Refills: 1 | Status: SHIPPED | OUTPATIENT
Start: 2024-02-20 | End: 2024-03-06

## 2024-02-20 RX ORDER — APREPITANT 40 MG/1
CAPSULE ORAL AS NEEDED
Status: DISCONTINUED | OUTPATIENT
Start: 2024-02-20 | End: 2024-02-20

## 2024-02-20 RX ORDER — PROPOFOL 10 MG/ML
INJECTION, EMULSION INTRAVENOUS AS NEEDED
Status: DISCONTINUED | OUTPATIENT
Start: 2024-02-20 | End: 2024-02-20

## 2024-02-20 RX ORDER — OXYMETAZOLINE HCL 0.05 %
2 SPRAY, NON-AEROSOL (ML) NASAL EVERY 12 HOURS PRN
Qty: 15 ML | Refills: 0 | Status: SHIPPED | OUTPATIENT
Start: 2024-02-20 | End: 2024-02-23

## 2024-02-20 RX ORDER — CEFAZOLIN 1 G/1
INJECTION, POWDER, FOR SOLUTION INTRAVENOUS AS NEEDED
Status: DISCONTINUED | OUTPATIENT
Start: 2024-02-20 | End: 2024-02-20

## 2024-02-20 RX ORDER — SODIUM CHLORIDE 0.9 G/100ML
IRRIGANT IRRIGATION AS NEEDED
Status: DISCONTINUED | OUTPATIENT
Start: 2024-02-20 | End: 2024-02-20 | Stop reason: HOSPADM

## 2024-02-20 RX ADMIN — ROCURONIUM BROMIDE 50 MG: 10 INJECTION, SOLUTION INTRAVENOUS at 12:49

## 2024-02-20 RX ADMIN — ESMOLOL HYDROCHLORIDE 100 MG: 10 INJECTION INTRAVENOUS at 15:08

## 2024-02-20 RX ADMIN — SODIUM CHLORIDE, POTASSIUM CHLORIDE, SODIUM LACTATE AND CALCIUM CHLORIDE 50 ML/HR: 600; 310; 30; 20 INJECTION, SOLUTION INTRAVENOUS at 15:17

## 2024-02-20 RX ADMIN — SCOPALAMINE 1 PATCH: 1 PATCH, EXTENDED RELEASE TRANSDERMAL at 11:06

## 2024-02-20 RX ADMIN — PROPOFOL 200 MG: 10 INJECTION, EMULSION INTRAVENOUS at 12:49

## 2024-02-20 RX ADMIN — OXYCODONE HYDROCHLORIDE 5 MG: 5 TABLET ORAL at 18:51

## 2024-02-20 RX ADMIN — MIDAZOLAM HYDROCHLORIDE 2 MG: 1 INJECTION, SOLUTION INTRAMUSCULAR; INTRAVENOUS at 12:45

## 2024-02-20 RX ADMIN — HYDROMORPHONE HYDROCHLORIDE 1 MG: 1 INJECTION, SOLUTION INTRAMUSCULAR; INTRAVENOUS; SUBCUTANEOUS at 15:25

## 2024-02-20 RX ADMIN — ONDANSETRON 4 MG: 2 INJECTION INTRAMUSCULAR; INTRAVENOUS at 15:56

## 2024-02-20 RX ADMIN — LABETALOL HYDROCHLORIDE 5 MG: 5 INJECTION, SOLUTION INTRAVENOUS at 14:51

## 2024-02-20 RX ADMIN — DEXAMETHASONE SODIUM PHOSPHATE 10 MG: 10 INJECTION INTRAMUSCULAR; INTRAVENOUS at 13:05

## 2024-02-20 RX ADMIN — ONDANSETRON 4 MG: 2 INJECTION, SOLUTION INTRAMUSCULAR; INTRAVENOUS at 14:55

## 2024-02-20 RX ADMIN — SODIUM CHLORIDE, SODIUM LACTATE, POTASSIUM CHLORIDE, AND CALCIUM CHLORIDE: 600; 310; 30; 20 INJECTION, SOLUTION INTRAVENOUS at 12:45

## 2024-02-20 RX ADMIN — LIDOCAINE HYDROCHLORIDE 100 MG: 20 INJECTION, SOLUTION INTRAVENOUS at 12:49

## 2024-02-20 RX ADMIN — REMIFENTANIL HYDROCHLORIDE 0.05 MCG/KG/MIN: 1 INJECTION, POWDER, LYOPHILIZED, FOR SOLUTION INTRAVENOUS at 13:00

## 2024-02-20 RX ADMIN — HYDROMORPHONE HYDROCHLORIDE 0.5 MG: 1 INJECTION, SOLUTION INTRAMUSCULAR; INTRAVENOUS; SUBCUTANEOUS at 15:41

## 2024-02-20 RX ADMIN — CEFAZOLIN 3 G: 330 INJECTION, POWDER, FOR SOLUTION INTRAMUSCULAR; INTRAVENOUS at 13:06

## 2024-02-20 RX ADMIN — ROCURONIUM BROMIDE 30 MG: 10 INJECTION, SOLUTION INTRAVENOUS at 13:50

## 2024-02-20 RX ADMIN — ACETAMINOPHEN 650 MG: 325 TABLET ORAL at 17:28

## 2024-02-20 RX ADMIN — HYDROMORPHONE HYDROCHLORIDE 0.5 MG: 1 INJECTION, SOLUTION INTRAMUSCULAR; INTRAVENOUS; SUBCUTANEOUS at 16:05

## 2024-02-20 RX ADMIN — SUGAMMADEX 400 MG: 100 INJECTION, SOLUTION INTRAVENOUS at 15:05

## 2024-02-20 RX ADMIN — HYDRALAZINE HYDROCHLORIDE 5 MG: 20 INJECTION INTRAMUSCULAR; INTRAVENOUS at 17:05

## 2024-02-20 RX ADMIN — ESMOLOL HYDROCHLORIDE 100 MG: 10 INJECTION INTRAVENOUS at 12:49

## 2024-02-20 RX ADMIN — APREPITANT 40 MG: 40 CAPSULE ORAL at 11:05

## 2024-02-20 RX ADMIN — REMIFENTANIL HYDROCHLORIDE 20 MCG: 1 INJECTION, POWDER, LYOPHILIZED, FOR SOLUTION INTRAVENOUS at 12:49

## 2024-02-20 ASSESSMENT — COLUMBIA-SUICIDE SEVERITY RATING SCALE - C-SSRS
1. IN THE PAST MONTH, HAVE YOU WISHED YOU WERE DEAD OR WISHED YOU COULD GO TO SLEEP AND NOT WAKE UP?: NO
6. HAVE YOU EVER DONE ANYTHING, STARTED TO DO ANYTHING, OR PREPARED TO DO ANYTHING TO END YOUR LIFE?: NO
2. HAVE YOU ACTUALLY HAD ANY THOUGHTS OF KILLING YOURSELF?: NO

## 2024-02-20 ASSESSMENT — PAIN - FUNCTIONAL ASSESSMENT
PAIN_FUNCTIONAL_ASSESSMENT: 0-10
PAIN_FUNCTIONAL_ASSESSMENT: UNABLE TO SELF-REPORT
PAIN_FUNCTIONAL_ASSESSMENT: 0-10
PAIN_FUNCTIONAL_ASSESSMENT: UNABLE TO SELF-REPORT
PAIN_FUNCTIONAL_ASSESSMENT: 0-10

## 2024-02-20 ASSESSMENT — PAIN SCALES - GENERAL
PAINLEVEL_OUTOF10: 5 - MODERATE PAIN
PAINLEVEL_OUTOF10: 2
PAINLEVEL_OUTOF10: 0 - NO PAIN
PAINLEVEL_OUTOF10: 2
PAINLEVEL_OUTOF10: 8
PAIN_LEVEL: 3
PAINLEVEL_OUTOF10: 4
PAINLEVEL_OUTOF10: 0 - NO PAIN
PAINLEVEL_OUTOF10: 10 - WORST POSSIBLE PAIN
PAINLEVEL_OUTOF10: 2
PAINLEVEL_OUTOF10: 10 - WORST POSSIBLE PAIN

## 2024-02-20 NOTE — ANESTHESIA PREPROCEDURE EVALUATION
Patient: Shahla LI Juve    Procedure Information       Date/Time: 02/20/24 1100    Procedures:       Bilateral endoscopic sinus surgery, septoplasty, inferior turbinate submucous resections with IGS (Left)      Nasal Endoscopy with Excision Tissue Maxillary Sinus (Bilateral)      Repair Septum Nasal Cavity      Navigation-Assisted Surgery      Excision Nasal Turbinate (Bilateral)    Location: [facility] OR 05 / [facility] OR    Surgeons: Cj Wu MD            Relevant Problems   Anesthesia (within normal limits)      Neuro/Psych   (+) Bipolar depression (CMS/HCC)      Pulmonary   (+) Obstructive sleep apnea of adult      Eyes, Ears, Nose, and Throat  Sinus symptoms.       Clinical information reviewed:   Tobacco  Allergies  Meds   Med Hx  Surg Hx  OB Status  Fam Hx  Soc   Hx        NPO Detail:  NPO/Void Status  Date of Last Liquid: 02/19/24  Time of Last Liquid: 2300  Date of Last Solid: 02/19/24  Time of Last Solid: 2300  Last Intake Type: Clear fluids         Physical Exam    Airway  Mallampati: III     Cardiovascular    Dental    Pulmonary    Abdominal            Anesthesia Plan    History of general anesthesia?: yes  History of complications of general anesthesia?: no    ASA 2     general     intravenous induction   Anesthetic plan and risks discussed with patient.    Plan discussed with CAA.

## 2024-02-20 NOTE — ANESTHESIA PROCEDURE NOTES
Airway  Date/Time: 2/20/2024 12:53 PM  Urgency: elective    Airway not difficult    Staffing  Performed: BROCK   Authorized by: Amy Monroe MD    Performed by: BROCK [place]  Patient location during procedure: OR    Indications and Patient Condition  Indications for airway management: anesthesia  Spontaneous ventilation: present  Sedation level: deep  Preoxygenated: yes  Patient position: sniffing  Mask difficulty assessment: 1 - vent by mask    Final Airway Details  Final airway type: endotracheal airway      Successful airway: ETT  Cuffed: yes   Successful intubation technique: video laryngoscopy  Facilitating devices/methods: cricoid pressure and intubating stylet  Endotracheal tube insertion site: oral  Blade size: #3  ETT size (mm): 6.0  Cormack-Lehane Classification: grade I - full view of glottis  Placement verified by: chest auscultation, capnometry and palpation of cuff   Measured from: lips  ETT to lips (cm): 20  Number of attempts at approach: 1

## 2024-02-20 NOTE — ANESTHESIA POSTPROCEDURE EVALUATION
Patient: Shahla An    Procedure Summary       Date: 02/20/24 Room / Location: [facility] OR 05 / [facility]    Anesthesia Start: 1245 Anesthesia Stop: 1531    Procedure: Bilateral endoscopic sinus surgery, septoplasty, inferior turbinate submucous resections with IGS (Left: Nose) Diagnosis:       Chronic maxillary sinusitis      Chronic ethmoidal sinusitis      Chronic sphenoidal sinusitis      Chronic pansinusitis      Deviated nasal septum      Hypertrophy of nasal turbinates      Nasal and sinus discharge      (Chronic maxillary sinusitis [J32.0])      (Chronic ethmoidal sinusitis [J32.2])      (Chronic sphenoidal sinusitis [J32.3])      (Chronic pansinusitis [J32.4])      (Deviated nasal septum [J34.2])      (Hypertrophy of nasal turbinates [J34.3])      (Nasal and sinus discharge [J34.89])    Surgeons: Cj Wu MD Responsible Provider: Amy Monroe MD    Anesthesia Type: general ASA Status: 2            Anesthesia Type: general    Vitals Value Taken Time   /86 02/20/24 1538   Temp 36 °C (96.8 °F) 02/20/24 1517   Pulse 81 02/20/24 1539   Resp 25 02/20/24 1539   SpO2 90 % 02/20/24 1539   Vitals shown include unvalidated device data.    Anesthesia Post Evaluation    Patient location during evaluation: PACU  Patient participation: complete - patient participated  Level of consciousness: awake and alert  Pain score: 3  Pain management: adequate  Airway patency: patent  Cardiovascular status: acceptable  Respiratory status: acceptable  Hydration status: acceptable  Postoperative Nausea and Vomiting: none        There were no known notable events for this encounter.

## 2024-02-20 NOTE — OP NOTE
Operative Note     Date: 2024  OR Location: [facility] OR    Name: Shahla An, : 1985, Age: 39 y.o., MRN: [id number], Sex: female    Pre-op Diagnosis     * Chronic maxillary sinusitis [J32.0]     * Chronic ethmoidal sinusitis [J32.2]     * Chronic sphenoidal sinusitis [J32.3]     * Chronic pansinusitis [J32.4]     * Deviated nasal septum [J34.2]     * Hypertrophy of nasal turbinates [J34.3]     * Nasal and sinus discharge [J34.89]    Post-op  Diagnosis     * Chronic maxillary sinusitis [J32.0]     * Chronic ethmoidal sinusitis [J32.2]     * Chronic sphenoidal sinusitis [J32.3]     * Chronic pansinusitis [J32.4]     * Deviated nasal septum [J34.2]     * Hypertrophy of nasal turbinates [J34.3]     * Nasal and sinus discharge [J34.89]    Operation/Procedure(s):   1. Left endoscopic total ethmoidectomy with sphenoidotomy with removal of tissue from sinus  2. Bilateral endoscopic maxillary antrostomies with removal of tissue from sinuses   3. Right endoscopic partial ethmoidectomy (anterior)  4. Septoplasty  5. Bilateral inferior turbinate submucous resection  6. Imaged guidance navigation - extradural    Surgeon: Cj Wu MD FACS    Assistant(s): Dodie Corcoran MD (resident)    EBL: 200 ml    Anesthesia: General and local     Operative Findings:  1. 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 Removal of middle turbinate on right side.  2. Gallardo Splint sutured to the septum bilaterally  3. Absorbable hemostatic material in the ethmoids    Operative Indications:   The patient is a 39 year old female with a history of chronic rhinosinusitis - bilateral chronic maxillary with left sided mass, bilateral ethmoid (anterior and limited on right), left sphenoid sinus chronic inflammation, deviated nasal septum and inferior turbinate hypertrophy. The patient was treated with maximal medical therapy and  a post-treatment CT scan showed persistent disease. Therefore, the risks, benefits, and alternatives of the above procedures were discussed and the patient agreed to proceed. Please see the ambulatory EMR for full documentation and detail of this discussion.    Operative/Procedure Narrative:   The patient was brought into the operating room and laid in a supine position on the operating room table. A surgical huddle was conducted to verify the patient and the procedure to be performed. General anesthesia was induced and the patient's airway was secured with an ET tube. A time out was called. The nasal cavities were sprayed with 0.5% Afrin. The right and left nasal cavities were then injected with lidocaine 1% with 1:100,000 epinephrine. Next, image guidance was attached and registered. The image guidance was used through the procedure for identification of critical landmarks. Once the image guidance was calibrated, the nasal cavities were examined with a 0 degree endoscope.    The patient was noted to have enlarged inferior turbinates on the right and left, a broad septal deviation to the left.   Using a zero degree endoscope for visualization and a Danielson elevator on the right, the middle turbinate was medialized. The double ball probe and backbiting forceps were used to perform a retrograde uncinectomy. The probe was then used to cannulate the maxillary os and a large maxillary antrostomy was made with a straight through cutting forceps. The edges were cleaned up with the microdebrider. The maxillary sinus was entered and suctioned and tissue was removed. Attention was then turned to the anterior superior ethmoid air cells. A small anterior ethmoid air cell was opened to ensure drainage from the middle meatus. This completed our right sided partial ethmoidectomy. The middle turbinate was removed as it was largely destabilized.    Attention was then turned towards the septoplasty. A #15 blade was used to make a  Modified Maicol incision at the squamocolumnar junction on the left. A Goshen elevator was used to elevate a mucoperichondrial flap on the left septum. An incision was made through the cartilage using the suction elevator then the opposing septal flap was elevated. Using a Rand forceps the intervening septal cartilage was removed, taking care to leave an approximately 2-cm dorsal and caudal cartilaginous strut for support. There were unilateral mucosal tears but none contralaterally. The septum was then noted to be straight and the airway improved on both sides. The incision was closed using interrupted 4-0 plain gut sutures.     The left nasal cavity was then examined with the 0 degree endoscope and the middle turbinate was medialized with a freer.  There was noted to be a large watery mass eminating from the maxillary sinus and extending into the nasopharynx and completely obstructing it. This mass was removed and sent for routine pathology. The double ball probe and backbiting forceps were used to perform a retrograde uncinectomy. The probe was then used to cannulate the maxillary os and a large maxillary antrostomy was made with a straight through cutting forceps. The edges were cleaned up with the microdebrider. The maxillary sinus was entered and suctioned and  tissue was removed. Attention was then turned to the ethmoid air cells. The ethmoid bulla and basal lamella were resected with cutting instruments and the microdebrider. The location of the skull base and lamina papyracea was periodically confirmed with the surgical navigation. Posteriorly, the superior turbinate was identified and the inferior third was excised using the microdebrider. The sphenoid ostium was cannulated using the probe. The mushroom punch was used to complete a sphenoidotomy. Tissue was then removed from the sphenoid sinus using the microdebrider. We then proceeded in a posterior to anterior fashion dissecting remnant ethmoid air  cells of the skull base and orbit to complete a total ethmoidectomy.     The right and left inferior turbinates were then infiltrated with 1% lidocaine with epinephrine. A stab incision was then made in the head of the left inferior turbinate and the right inferior turbinate. A Cavalier elevator was used to elevate the mucous membrane off the inferior conchal bone on both sides and the microdebrider inferior turbinate blade attachment was then used to perform a submucous resection of tissue in the right and then the left inferior turbinates. The turbinates were then outfractured laterally with a Boies elevator. The nasopharynx was then suctioned and the nose was copiously irrigated with normal saline. It was inspected for any bleeding and none was noted. Absorbable hemostatic material was placed in the bilateral ethmoid cavities.  Gallardo splints were placed and secured to the septum with a prolene suture. This completed the surgical procedure. An OG tube was passed to suction out gastric contents. The patient was turned over to the anesthesia team for awakening and extubation. They were transferred to the PACU in stable condition.     Implants/Packing: Bilateral absorbable hemostatic material and gallardo splints sutured to septum    Specimens:   Bilateral sinonasal contents to pathology     Complications:   None    Attestation: I, Cj Wu, was present for and completed all key portions of the procedure with the assistance of the resident as I deemed fit.        Cj Wu  Phone Number: [phone]

## 2024-02-21 ASSESSMENT — PAIN SCALES - GENERAL: PAINLEVEL_OUTOF10: 5 - MODERATE PAIN

## 2024-02-28 NOTE — PROGRESS NOTES
HPI   Shahla An is a 39 y.o. 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 The patient had surgery 2/20/2024.  2/29/24 - Patient presents for her 1st post op visit. She reports acute concerns of difficulty with her sense of taste and smell. She reports expected post operative congestion and edema. The patient has been doing saline irrigations 3-4 times daily, which have been productive of crusts and dried/old blood. She denies excessive bleeding, constant clear fluid from nose, severe headaches, double vision, or fevers.    Operative Report:  Date of Service: 2/20/2024  Location: [facility] OR     Post-op Diagnoses:  * Chronic maxillary sinusitis [J32.0]  * Chronic ethmoidal sinusitis [J32.2]  * Chronic sphenoidal sinusitis [J32.3]  * Chronic pansinusitis [J32.4]  * Deviated nasal septum [J34.2]  * Hypertrophy of nasal turbinates [J34.3]  * Nasal and sinus discharge [J34.89]    Operation/Procedures:  1. Left endoscopic total ethmoidectomy with sphenoidotomy with removal of tissue from sinus  2. Bilateral endoscopic maxillary antrostomies with removal of tissue from sinuses   3. Right endoscopic partial ethmoidectomy (anterior)  4. Septoplasty  5. Bilateral inferior turbinate submucous resection  6. Imaged guidance navigation - extradural     Operative Findings:  1. 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 Removal of middle turbinate on right side.  2. Gallardo Splint sutured to the septum bilaterally  3. Absorbable hemostatic material in the ethmoids    Surgeon: Cj Wu MD FACS  Assistant(s): Dodie Corcoran MD (resident)  EBL: 200 ml  Anesthesia: General and local    Review of  Systems   Negative for constitutional, eyes, cardiac, pulmonary, hepatic, renal, digestive, hematologic, epileptic, syncopal, musculoskeletal, mental health, integumentary, hypertensive, lipid, arthritic, diabetic, thyroid or neurologic disorders (except as listed in the HPI, PMH and Problem List).    Assessment   Shahla An is a 39 y.o. 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 The patient had surgery 2/20/2024.  2/29/24 - 1st post op. Expected post op swelling today perhaps more severe in the sphenoids. Debridement performed as noted. Rx budesonide irrigations BID. Continue saline irrigations TID.    Plan   Nasal Endoscopy with debridement. Findings: expected post op swelling.  Patient was prescribed budesonide irrigations BID. Prescription sent to [facility]. Patient was instructed to increase nasal saline irrigations to TID.  Reassurance provided to patient, the nose is healing and edema at this point is expected.  Follow up as scheduled for repeat evaluation and debridement.    Cj Wu MD [facility]  Division of Rhinology, Sinus, and Skull Base Surgery       Exam   General: This is a healthy appearing female who appears her stated age. The patient is alert and appropriately verbally conversant without hoarseness.  Face: The face was inspected and no cutaneous masses or lesions were visualized. There was no erythema or edema noted. Facial movement was symmetric without weakness. No skin lesions were detected.  Eyes: Extra-ocular muscle function was intact. No nystagmus was observed. Pupils were equal.    Nose: Examination of the nose revealed the nasal dorsum to be midline. Intranasal exam reveals the septum is healthy without perforation. The inferior turbinates were expectedly  edematous. Crusts and dried secretions noted on anterior rhinoscopy. See below procedure note as applicable for further exam.    Procedure Note:  Procedure: Nasal endoscopy with debridement - bilateral  Indication: Chronic rhinosinusitis, post operative from sinus surgery  Informed consent obtained: risks, benefits, alternatives, and expectations discussed with patient and the patient wishes to proceed.    Findings: After anesthesia and decongestion with topical lidocaine and Afrin spray, the nasal cavities were examined and debrided with a zero and/or 30-degree endoscope. Large crusts were taken down from the anterior nasal chambers with a straight Blakesley forceps. Crusts were debrided and removed from the middle meatus and maxillary cavity on the right and left. Sphenoidotomy on the left is with severe edema. The maxillary and ethmoid sinuses are widely patent bilaterally The frontal recesses were clear.  No pus or polyps were noted. There is no CSF leak. Things are healing well. The patient tolerated the procedure well and there were no complications.       Scribe Attestation  By signing my name below, I, Jia Murphy, attest that this documentation has been prepared under the direction and in the presence of Cj Wu MD. All medical record entries made by the Scribe were at my direction or personally dictated by me. I have reviewed the chart and agree that the record accurately reflects my personal performance of the history, physical exam, discussion and plan.

## 2024-02-28 NOTE — PATIENT INSTRUCTIONS
PATIENT INSTRUCTIONS ARE COMPLETE and READY TO PRINT    Please observe the following post-op precautions for 2 weeks from the date of your surgery (until at least March 5):  Please refrain from blowing your nose. Please do not stifle any sneezes. If you must sneeze, try to sneeze through an open mouth. Please do not stick anything in your nose other than any medicine or irrigations we recommend. Please do not insert a Q-tip or finger in the nose because this will cause further trauma. Please refrain from any activities that will increase your heart rate or blood pressure. Please do not exercise or do any strenuous activities. Please refrain from lifting objects greater than 10 lbs. Try to keep your head above your heart as much as possible. Please avoid all traveling outside your local area during this 2-week precaution period.    Start saline irrigations and budesonide irrigations as noted below:  In the morning, do a saline irrigation followed by a budesonide irrigation.  In the middle of the day, do 2 saline irrigations back to back.  In the evening, do a saline irrigation followed by a budesonide irrigation.  To prepare a budesonide irrigation: Add 1 NeilMed salt packet to 8 ounces of distilled water, or use the recipe below to make your own saline solution. Then, add 1 budesonide capsule to the saline solution. Shake to dissolve. Use half of the mixture (4 ounces) in each nostril.  Start doing back-to-back saline irrigations 3 times daily until you receive the budesonide.  Make sure that the budesonide irrigation is the last irrigation you do for several hours and the last irrigation before going to sleep.    Recipe to Make your Own Nasal Saline Solution:  Mix 8 ounces of distilled water or tap water (be sure to boil tap water, then let it cool down) with 1/2 teaspoon of baking soda and 1/2 teaspoon of table salt and shake bottle to dissolve.    We have ordered budesonide through a company called [email or web address].  This is a compounding pharmacy that we use to make medications for your irrigations more affordable. Dr. Wu will fax the order over to [facility] today. This will result in an out of pocket expense for you of approx $55 per month (instead of $300 without a compounding pharmacy). The compounding pharmacy will call you in the next few days to establish form of payment and verify the address to send the medication. If you have not heard from them after 3-4 days from today's visit, please call our office at [phone] so that we can get in contact with them.    Please follow up with me March 14 at 10:30 am for your second postoperative visit. Please feel free to contact my office by calling [phone] with any questions.    Irrigation Technique:  Stand with your head forward over the sink or in the bathtub to prevent water from going down into your throat. The goal is to get the irrigation back out of the opposite nostril after irrigation. Irrigate each side of the nose with 4 ounces (about 120 mL) of the solution. You may buy the salt packets that come with the NeilMed irrigation bottle or use the recipe provided above to make your own nasal saline. Irrigate each side of the nose with mild force/squeezing of the bottle. If you feel like the solution is going into your ears, adjust your head angle or use less force with the bottle. The first couple times you use the solution, your nose may burn a bit, but this will go away with time.    Scribe Attestation  By signing my name below, I, Jia Murphy, attest that this documentation has been prepared under the direction and in the presence of Cj Wu MD. All medical record entries made by the Scribe were at my direction or personally dictated by me. I have reviewed the chart and agree that the record accurately reflects my personal performance of the history, physical exam, discussion and plan.

## 2024-02-29 ENCOUNTER — OFFICE VISIT (OUTPATIENT)
Dept: OTOLARYNGOLOGY | Facility: CLINIC | Age: 39
End: 2024-02-29
Payer: COMMERCIAL

## 2024-02-29 VITALS — WEIGHT: 266 LBS | HEIGHT: 62 IN | BODY MASS INDEX: 48.95 KG/M2

## 2024-02-29 DIAGNOSIS — J45.909 ASTHMA, UNSPECIFIED ASTHMA SEVERITY, UNSPECIFIED WHETHER COMPLICATED, UNSPECIFIED WHETHER PERSISTENT (HHS-HCC): Primary | ICD-10-CM

## 2024-02-29 DIAGNOSIS — J34.89 NASAL CRUSTING: ICD-10-CM

## 2024-02-29 DIAGNOSIS — J32.4 CHRONIC PANSINUSITIS: Primary | ICD-10-CM

## 2024-02-29 DIAGNOSIS — J32.8 OTHER CHRONIC SINUSITIS: ICD-10-CM

## 2024-02-29 DIAGNOSIS — J32.4 CHRONIC PANSINUSITIS: ICD-10-CM

## 2024-02-29 PROCEDURE — 31237 NSL/SINS NDSC SURG BX POLYPC: CPT | Performed by: OTOLARYNGOLOGY

## 2024-02-29 PROCEDURE — 99024 POSTOP FOLLOW-UP VISIT: CPT | Performed by: OTOLARYNGOLOGY

## 2024-02-29 RX ORDER — METHYLPREDNISOLONE 4 MG/1
TABLET ORAL
Qty: 21 TABLET | Refills: 0 | Status: SHIPPED | OUTPATIENT
Start: 2024-02-29 | End: 2024-03-07

## 2024-02-29 RX ORDER — BUDESONIDE
0.6 POWDER (GRAM) MISCELLANEOUS 2 TIMES DAILY
Qty: 60 G | Refills: 0 | Status: SHIPPED | OUTPATIENT
Start: 2024-02-29 | End: 2024-03-14 | Stop reason: SDUPTHER

## 2024-02-29 RX ORDER — BUDESONIDE 0.5 MG/2ML
0.5 INHALANT ORAL 2 TIMES DAILY
Qty: 60 ML | Refills: 3 | Status: SHIPPED | OUTPATIENT
Start: 2024-02-29 | End: 2025-02-28

## 2024-03-06 LAB
LABORATORY COMMENT REPORT: NORMAL
PATH REPORT.FINAL DX SPEC: NORMAL
PATH REPORT.GROSS SPEC: NORMAL
PATH REPORT.RELEVANT HX SPEC: NORMAL
PATH REPORT.TOTAL CANCER: NORMAL

## 2024-03-14 ENCOUNTER — OFFICE VISIT (OUTPATIENT)
Dept: OTOLARYNGOLOGY | Facility: CLINIC | Age: 39
End: 2024-03-14
Payer: COMMERCIAL

## 2024-03-14 VITALS — WEIGHT: 266 LBS | BODY MASS INDEX: 47.13 KG/M2 | HEIGHT: 63 IN

## 2024-03-14 DIAGNOSIS — J32.4 CHRONIC PANSINUSITIS: Primary | ICD-10-CM

## 2024-03-14 DIAGNOSIS — J32.8 OTHER CHRONIC SINUSITIS: ICD-10-CM

## 2024-03-14 DIAGNOSIS — J34.89 NASAL CRUSTING: ICD-10-CM

## 2024-03-14 PROBLEM — D62 ACUTE BLOOD LOSS AS CAUSE OF POSTOPERATIVE ANEMIA: Status: ACTIVE | Noted: 2018-01-17

## 2024-03-14 PROBLEM — F43.0 ACUTE STRESS REACTION: Status: ACTIVE | Noted: 2018-01-25

## 2024-03-14 PROBLEM — M21.951: Status: ACTIVE | Noted: 2018-01-04

## 2024-03-14 PROBLEM — F31.9 BIPOLAR DEPRESSION (MULTI): Status: RESOLVED | Noted: 2023-02-24 | Resolved: 2024-03-14

## 2024-03-14 PROBLEM — R43.0 LOSS OF SENSE OF SMELL: Status: ACTIVE | Noted: 2024-03-14

## 2024-03-14 PROBLEM — S82.209B OPEN FRACTURE OF SHAFT OF TIBIA: Status: ACTIVE | Noted: 2018-01-31

## 2024-03-14 PROBLEM — R07.81 RIB PAIN: Status: ACTIVE | Noted: 2018-01-04
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
## 2024-03-14 PROBLEM — S80.10XA CONTUSION OF LOWER EXTREMITY: Status: ACTIVE | Noted: 2019-03-01

## 2024-03-14 PROBLEM — F41.9 ANXIETY: Status: ACTIVE | Noted: 2018-01-22

## 2024-03-14 PROBLEM — F17.210 CIGARETTE SMOKER: Status: ACTIVE | Noted: 2018-07-17

## 2024-03-14 PROBLEM — S22.49XA MULTIPLE RIB FRACTURES INVOLVING FOUR OR MORE RIBS: Status: ACTIVE | Noted: 2018-01-04

## 2024-03-14 PROBLEM — T07.XXXA MULTIPLE TRAUMA: Status: ACTIVE | Noted: 2018-01-05

## 2024-03-14 PROBLEM — S90.456A: Status: RESOLVED | Noted: 2023-02-24 | Resolved: 2024-03-14

## 2024-03-14 PROBLEM — F32.A DEPRESSION: Status: ACTIVE | Noted: 2018-01-22

## 2024-03-14 PROCEDURE — 4004F PT TOBACCO SCREEN RCVD TLK: CPT | Performed by: OTOLARYNGOLOGY

## 2024-03-14 PROCEDURE — 31237 NSL/SINS NDSC SURG BX POLYPC: CPT | Performed by: OTOLARYNGOLOGY

## 2024-03-14 PROCEDURE — 99024 POSTOP FOLLOW-UP VISIT: CPT | Performed by: OTOLARYNGOLOGY

## 2024-03-14 RX ORDER — IBUPROFEN 800 MG/1
800 TABLET ORAL 3 TIMES DAILY
COMMUNITY
Start: 2023-01-25

## 2024-03-14 RX ORDER — BUDESONIDE
0.6 POWDER (GRAM) MISCELLANEOUS 2 TIMES DAILY
Qty: 180 G | Refills: 0 | Status: SHIPPED | OUTPATIENT
Start: 2024-03-14

## 2024-03-14 NOTE — PROGRESS NOTES
HPI   Shahla An is a 39 y.o. 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 The patient had surgery 2/20/2024.  2/29/24 - Patient presents for her 1st post op visit. She reports acute concerns of difficulty with her sense of taste and smell. She reports expected post operative congestion and edema. The patient has been doing saline irrigations 3-4 times daily, which have been productive of crusts and dried/old blood. She denies excessive bleeding, constant clear fluid from nose, severe headaches, double vision, or fevers.  3/14/24 - Patient presents for her 2nd post op visit. She continues to have difficulty with her taste and smell. She has been using budesonide twice daily for about 1 week. She denies excessive bleeding, constant clear fluid from nose, severe headaches, double vision, or fevers.    Operative Report:  Date of Service: 2/20/2024  Location: [facility] OR     Post-op Diagnoses:  * Chronic maxillary sinusitis [J32.0]  * Chronic ethmoidal sinusitis [J32.2]  * Chronic sphenoidal sinusitis [J32.3]  * Chronic pansinusitis [J32.4]  * Deviated nasal septum [J34.2]  * Hypertrophy of nasal turbinates [J34.3]  * Nasal and sinus discharge [J34.89]    Operation/Procedures:  1. Left endoscopic total ethmoidectomy with sphenoidotomy with removal of tissue from sinus  2. Bilateral endoscopic maxillary antrostomies with removal of tissue from sinuses   3. Right endoscopic partial ethmoidectomy (anterior)  4. Septoplasty  5. Bilateral inferior turbinate submucous resection  6. Imaged guidance navigation - extradural     Operative Findings:  1. 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  Removal of middle turbinate on right side.  2. Gallardo Splint sutured to the septum bilaterally  3. Absorbable hemostatic material in the ethmoids    Surgeon: Cj Wu MD FACS  Assistant(s): Dodie Corcoran MD (resident)  EBL: 200 ml  Anesthesia: General and local    Review of Systems   Negative for constitutional, eyes, cardiac, pulmonary, hepatic, renal, digestive, hematologic, epileptic, syncopal, musculoskeletal, mental health, integumentary, hypertensive, lipid, arthritic, diabetic, thyroid or neurologic disorders (except as listed in the HPI, PMH and Problem List).    Assessment   Shahla An is a 39 y.o. 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 The patient had surgery 2/20/2024.  2/29/24 - 1st post op. Expected post op swelling today perhaps more severe in the sphenoids. Debridement performed as noted. Rx budesonide irrigations BID. Continue saline irrigations TID.  3/14/24 - 2nd post op. Continue budesonide irrigations BID.    Plan   Nasal Endoscopy with debridement. Findings: expected post op swelling.  Continue budesonide irrigations BID. Patient was instructed to continue nasal saline irrigations prior to the budesonide PRN.  Reassurance provided to patient, the nose is healing and edema at this point is expected.  Follow up in 2 months to repeat evaluation.    Cj Wu MD [facility]  Division of Rhinology, Sinus, and Skull Base Surgery       Exam   General: This is a healthy appearing female who appears her stated age. The patient is alert and appropriately verbally conversant without hoarseness.  Face: The face was inspected and no cutaneous masses or lesions were visualized. There was no erythema or edema noted. Facial movement was symmetric without weakness. No skin  lesions were detected.  Eyes: Extra-ocular muscle function was intact. No nystagmus was observed. Pupils were equal.    Nose: Examination of the nose revealed the nasal dorsum to be midline. Intranasal exam reveals the septum is healthy without perforation. The inferior turbinates were expectedly edematous. Crusts and dried secretions noted on anterior rhinoscopy. See below procedure note as applicable for further exam.    Procedure Note:  Procedure: Nasal endoscopy with debridement - bilateral  Indication: Chronic rhinosinusitis, post operative from sinus surgery  Informed consent obtained: risks, benefits, alternatives, and expectations discussed with patient and the patient wishes to proceed.    Findings: After anesthesia and decongestion with topical lidocaine and Afrin spray, the nasal cavities were examined and debrided with a zero and/or 30-degree endoscope. Large crusts were taken down from the anterior nasal chambers with a straight Blakesley forceps. Crusts were debrided and removed from the middle meatus and maxillary cavity on the right and left. Sphenoidotomy on the left is clear. The right max with severe edema. The maxillary and ethmoid sinuses are widely patent bilaterally The frontal recesses were clear.  No pus or polyps were noted. There is no CSF leak. Things are healing well. The patient tolerated the procedure well and there were no complications.       Scribe Attestation  By signing my name below, ICapo Scribe attest that this documentation has been prepared under the direction and in the presence of Cj Wu MD. By signing my name below, IKalyan Scribe, attest that this documentation has been prepared under the direction and in the presence of Cj Wu MD. All medical record entries made by the Scribe were at my direction or personally dictated by me. I have reviewed the chart and agree that the record accurately reflects my personal performance of the  history, physical exam, discussion and plan.

## 2024-03-14 NOTE — PATIENT INSTRUCTIONS
PATIENT INSTRUCTIONS ARE COMPLETE and READY TO PRINT    Continue saline irrigations and budesonide irrigations as noted below:  In the morning, do an optional saline irrigation followed by a budesonide irrigation.  In the evening, do an optional saline irrigation followed by a budesonide irrigation.  The saline irrigations preceding the budesonide irrigations are optional, they are not necessary if you do not find them helpful  To prepare a budesonide irrigation: Add 1 NeilMed salt packet to 8 ounces of distilled water, or use the recipe below to make your own saline solution. Then, add 1 budesonide capsule to the saline solution. Shake to dissolve. Use half of the mixture (4 ounces) in each nostril.  Make sure that the budesonide irrigation is the last irrigation you do for several hours and the last irrigation before going to sleep.    Recipe to Make your Own Nasal Saline Solution:  Mix 8 ounces of distilled water or tap water (be sure to boil tap water, then let it cool down) with 1/2 teaspoon of baking soda and 1/2 teaspoon of table salt and shake bottle to dissolve.    Follow up:  Please follow up with me in 2 months for reevaluation or sooner with any questions or concerns. Please feel free to contact my office at [phone] with any questions.    Scribe Attestation  By signing my name below, ICapo Scribe attest that this documentation has been prepared under the direction and in the presence of Cj Wu MD. By signing my name below, Kalyan CABALLERO Scribe, attest that this documentation has been prepared under the direction and in the presence of Cj Wu MD. All medical record entries made by the Scribe were at my direction or personally dictated by me. I have reviewed the chart and agree that the record accurately reflects my personal performance of the history, physical exam, discussion and plan.

## 2024-07-26 ENCOUNTER — APPOINTMENT (OUTPATIENT)
Dept: RADIOLOGY | Facility: HOSPITAL | Age: 39
End: 2024-07-26
Payer: COMMERCIAL

## 2024-07-26 ENCOUNTER — HOSPITAL ENCOUNTER (EMERGENCY)
Facility: HOSPITAL | Age: 39
Discharge: HOME | End: 2024-07-26
Attending: STUDENT IN AN ORGANIZED HEALTH CARE EDUCATION/TRAINING PROGRAM
Payer: COMMERCIAL

## 2024-07-26 VITALS
SYSTOLIC BLOOD PRESSURE: 149 MMHG | HEIGHT: 62 IN | HEART RATE: 56 BPM | DIASTOLIC BLOOD PRESSURE: 80 MMHG | RESPIRATION RATE: 18 BRPM | OXYGEN SATURATION: 100 % | BODY MASS INDEX: 52.81 KG/M2 | TEMPERATURE: 98.7 F | WEIGHT: 287 LBS

## 2024-07-26 DIAGNOSIS — N20.1 URETERAL STONE: Primary | ICD-10-CM

## 2024-07-26 LAB
ALBUMIN SERPL BCP-MCNC: 3.9 G/DL (ref 3.4–5)
ALP SERPL-CCNC: 50 U/L (ref 33–110)
ALT SERPL W P-5'-P-CCNC: 6 U/L (ref 7–45)
ANION GAP SERPL CALC-SCNC: 11 MMOL/L (ref 10–20)
APPEARANCE UR: CLEAR
AST SERPL W P-5'-P-CCNC: 11 U/L (ref 9–39)
B-HCG SERPL-ACNC: <2 MIU/ML
BASOPHILS # BLD AUTO: 0.06 X10*3/UL (ref 0–0.1)
BASOPHILS NFR BLD AUTO: 0.5 %
BILIRUB SERPL-MCNC: 0.3 MG/DL (ref 0–1.2)
BILIRUB UR STRIP.AUTO-MCNC: NEGATIVE MG/DL
BUN SERPL-MCNC: 10 MG/DL (ref 6–23)
CALCIUM SERPL-MCNC: 9 MG/DL (ref 8.6–10.3)
CAOX CRY #/AREA UR COMP ASSIST: ABNORMAL /HPF
CHLORIDE SERPL-SCNC: 108 MMOL/L (ref 98–107)
CO2 SERPL-SCNC: 25 MMOL/L (ref 21–32)
COLOR UR: YELLOW
CREAT SERPL-MCNC: 0.89 MG/DL (ref 0.5–1.05)
EGFRCR SERPLBLD CKD-EPI 2021: 86 ML/MIN/1.73M*2
EOSINOPHIL # BLD AUTO: 0.14 X10*3/UL (ref 0–0.7)
EOSINOPHIL NFR BLD AUTO: 1.2 %
ERYTHROCYTE [DISTWIDTH] IN BLOOD BY AUTOMATED COUNT: 14.4 % (ref 11.5–14.5)
GLUCOSE SERPL-MCNC: 102 MG/DL (ref 74–99)
GLUCOSE UR STRIP.AUTO-MCNC: NORMAL MG/DL
HCT VFR BLD AUTO: 41.1 % (ref 36–46)
HGB BLD-MCNC: 13.4 G/DL (ref 12–16)
HOLD SPECIMEN: NORMAL
IMM GRANULOCYTES # BLD AUTO: 0.04 X10*3/UL (ref 0–0.7)
IMM GRANULOCYTES NFR BLD AUTO: 0.3 % (ref 0–0.9)
KETONES UR STRIP.AUTO-MCNC: NEGATIVE MG/DL
LEUKOCYTE ESTERASE UR QL STRIP.AUTO: NEGATIVE
LIPASE SERPL-CCNC: 12 U/L (ref 9–82)
LYMPHOCYTES # BLD AUTO: 2.04 X10*3/UL (ref 1.2–4.8)
LYMPHOCYTES NFR BLD AUTO: 17.4 %
MCH RBC QN AUTO: 28.2 PG (ref 26–34)
MCHC RBC AUTO-ENTMCNC: 32.6 G/DL (ref 32–36)
MCV RBC AUTO: 87 FL (ref 80–100)
MONOCYTES # BLD AUTO: 0.65 X10*3/UL (ref 0.1–1)
MONOCYTES NFR BLD AUTO: 5.6 %
MUCOUS THREADS #/AREA URNS AUTO: ABNORMAL /LPF
NEUTROPHILS # BLD AUTO: 8.77 X10*3/UL (ref 1.2–7.7)
NEUTROPHILS NFR BLD AUTO: 75 %
NITRITE UR QL STRIP.AUTO: NEGATIVE
NRBC BLD-RTO: 0 /100 WBCS (ref 0–0)
PH UR STRIP.AUTO: 5.5 [PH]
PLATELET # BLD AUTO: 279 X10*3/UL (ref 150–450)
POTASSIUM SERPL-SCNC: 3.6 MMOL/L (ref 3.5–5.3)
PROT SERPL-MCNC: 6.3 G/DL (ref 6.4–8.2)
PROT UR STRIP.AUTO-MCNC: ABNORMAL MG/DL
RBC # BLD AUTO: 4.75 X10*6/UL (ref 4–5.2)
RBC # UR STRIP.AUTO: ABNORMAL /UL
RBC #/AREA URNS AUTO: ABNORMAL /HPF
SODIUM SERPL-SCNC: 140 MMOL/L (ref 136–145)
SP GR UR STRIP.AUTO: 1.05
SQUAMOUS #/AREA URNS AUTO: ABNORMAL /HPF
UROBILINOGEN UR STRIP.AUTO-MCNC: NORMAL MG/DL
WBC # BLD AUTO: 11.7 X10*3/UL (ref 4.4–11.3)
WBC #/AREA URNS AUTO: ABNORMAL /HPF

## 2024-07-26 PROCEDURE — 2550000001 HC RX 255 CONTRASTS: Performed by: STUDENT IN AN ORGANIZED HEALTH CARE EDUCATION/TRAINING PROGRAM

## 2024-07-26 PROCEDURE — 84702 CHORIONIC GONADOTROPIN TEST: CPT | Performed by: STUDENT IN AN ORGANIZED HEALTH CARE EDUCATION/TRAINING PROGRAM

## 2024-07-26 PROCEDURE — 99284 EMERGENCY DEPT VISIT MOD MDM: CPT

## 2024-07-26 PROCEDURE — 96374 THER/PROPH/DIAG INJ IV PUSH: CPT

## 2024-07-26 PROCEDURE — 81001 URINALYSIS AUTO W/SCOPE: CPT | Performed by: STUDENT IN AN ORGANIZED HEALTH CARE EDUCATION/TRAINING PROGRAM

## 2024-07-26 PROCEDURE — 74177 CT ABD & PELVIS W/CONTRAST: CPT | Mod: FOREIGN READ | Performed by: RADIOLOGY

## 2024-07-26 PROCEDURE — 96376 TX/PRO/DX INJ SAME DRUG ADON: CPT

## 2024-07-26 PROCEDURE — 80053 COMPREHEN METABOLIC PANEL: CPT | Performed by: STUDENT IN AN ORGANIZED HEALTH CARE EDUCATION/TRAINING PROGRAM

## 2024-07-26 PROCEDURE — 74177 CT ABD & PELVIS W/CONTRAST: CPT

## 2024-07-26 PROCEDURE — 83690 ASSAY OF LIPASE: CPT | Performed by: STUDENT IN AN ORGANIZED HEALTH CARE EDUCATION/TRAINING PROGRAM

## 2024-07-26 PROCEDURE — 36415 COLL VENOUS BLD VENIPUNCTURE: CPT | Performed by: STUDENT IN AN ORGANIZED HEALTH CARE EDUCATION/TRAINING PROGRAM

## 2024-07-26 PROCEDURE — 96375 TX/PRO/DX INJ NEW DRUG ADDON: CPT

## 2024-07-26 PROCEDURE — 2500000004 HC RX 250 GENERAL PHARMACY W/ HCPCS (ALT 636 FOR OP/ED): Performed by: STUDENT IN AN ORGANIZED HEALTH CARE EDUCATION/TRAINING PROGRAM

## 2024-07-26 PROCEDURE — 85025 COMPLETE CBC W/AUTO DIFF WBC: CPT | Performed by: STUDENT IN AN ORGANIZED HEALTH CARE EDUCATION/TRAINING PROGRAM

## 2024-07-26 PROCEDURE — 96361 HYDRATE IV INFUSION ADD-ON: CPT

## 2024-07-26 RX ORDER — ONDANSETRON HYDROCHLORIDE 2 MG/ML
4 INJECTION, SOLUTION INTRAVENOUS ONCE
Status: COMPLETED | OUTPATIENT
Start: 2024-07-26 | End: 2024-07-26

## 2024-07-26 RX ORDER — TAMSULOSIN HYDROCHLORIDE 0.4 MG/1
0.4 CAPSULE ORAL DAILY
Qty: 10 CAPSULE | Refills: 0 | Status: SHIPPED | OUTPATIENT
Start: 2024-07-26

## 2024-07-26 RX ORDER — ONDANSETRON 4 MG/1
4 TABLET, ORALLY DISINTEGRATING ORAL EVERY 8 HOURS PRN
Qty: 20 TABLET | Refills: 0 | Status: SHIPPED | OUTPATIENT
Start: 2024-07-26 | End: 2024-08-02

## 2024-07-26 RX ORDER — OXYCODONE HYDROCHLORIDE 5 MG/1
5 TABLET ORAL EVERY 6 HOURS PRN
Qty: 12 TABLET | Refills: 0 | Status: SHIPPED | OUTPATIENT
Start: 2024-07-26 | End: 2024-07-29

## 2024-07-26 RX ORDER — KETOROLAC TROMETHAMINE 30 MG/ML
15 INJECTION, SOLUTION INTRAMUSCULAR; INTRAVENOUS ONCE
Status: COMPLETED | OUTPATIENT
Start: 2024-07-26 | End: 2024-07-26

## 2024-07-26 RX ORDER — ACETAMINOPHEN 500 MG
1000 TABLET ORAL EVERY 6 HOURS PRN
Qty: 30 TABLET | Refills: 0 | Status: SHIPPED | OUTPATIENT
Start: 2024-07-26 | End: 2024-08-05

## 2024-07-26 RX ORDER — HYDROMORPHONE HYDROCHLORIDE 1 MG/ML
1 INJECTION, SOLUTION INTRAMUSCULAR; INTRAVENOUS; SUBCUTANEOUS ONCE
Status: COMPLETED | OUTPATIENT
Start: 2024-07-26 | End: 2024-07-26

## 2024-07-26 ASSESSMENT — PAIN SCALES - GENERAL
PAINLEVEL_OUTOF10: 9
PAINLEVEL_OUTOF10: 10 - WORST POSSIBLE PAIN
PAINLEVEL_OUTOF10: 9
PAINLEVEL_OUTOF10: 10 - WORST POSSIBLE PAIN

## 2024-07-26 ASSESSMENT — PAIN - FUNCTIONAL ASSESSMENT: PAIN_FUNCTIONAL_ASSESSMENT: 0-10

## 2024-07-26 ASSESSMENT — LIFESTYLE VARIABLES
HAVE PEOPLE ANNOYED YOU BY CRITICIZING YOUR DRINKING: NO
EVER FELT BAD OR GUILTY ABOUT YOUR DRINKING: NO
EVER HAD A DRINK FIRST THING IN THE MORNING TO STEADY YOUR NERVES TO GET RID OF A HANGOVER: NO
HAVE YOU EVER FELT YOU SHOULD CUT DOWN ON YOUR DRINKING: NO
TOTAL SCORE: 0

## 2024-07-26 ASSESSMENT — PAIN DESCRIPTION - DESCRIPTORS: DESCRIPTORS: STABBING

## 2024-07-26 ASSESSMENT — COLUMBIA-SUICIDE SEVERITY RATING SCALE - C-SSRS
6. HAVE YOU EVER DONE ANYTHING, STARTED TO DO ANYTHING, OR PREPARED TO DO ANYTHING TO END YOUR LIFE?: NO
1. IN THE PAST MONTH, HAVE YOU WISHED YOU WERE DEAD OR WISHED YOU COULD GO TO SLEEP AND NOT WAKE UP?: NO
2. HAVE YOU ACTUALLY HAD ANY THOUGHTS OF KILLING YOURSELF?: NO

## 2024-07-26 ASSESSMENT — PAIN DESCRIPTION - PAIN TYPE: TYPE: ACUTE PAIN

## 2024-07-26 ASSESSMENT — PAIN DESCRIPTION - LOCATION
LOCATION: ABDOMEN

## 2024-07-26 NOTE — DISCHARGE INSTRUCTIONS
Take medication as prescribed.  Follow-up with urology.  Come back to ED for any new or worsening symptoms.

## 2024-07-26 NOTE — ED PROVIDER NOTES
HPI   Chief Complaint   Patient presents with    Abdominal Pain       39-year-old female with past medical history of kidney stones, anxiety, asthma presents to ED with concerns for left lower abdominal pain.  Symptoms started suddenly few hours ago.  She has had some nausea or vomiting.  No diarrhea or constipation.  No dysuria, hematuria urinary frequency.  No fevers or chills.  Patient had 2 prior C-sections otherwise no other abdominal surgeries.              Patient History   Past Medical History:   Diagnosis Date    Allergic rhinitis     Anxiety     Arthritis     Asthma (HHS-HCC)     childhood    Bipolar disorder (Multi)     Cyst of nasal cavity     right    Dental disease     Depression     Headache     HL (hearing loss)     Hypoglycemia     Migraine     Nasal polyps     Nephrolithiasis     Nosebleed     Obesity     PTSD (post-traumatic stress disorder)     Sinusitis     Sleep apnea     CPAP-noncompliant    Sleep difficulties     TIA (transient ischemic attack)      or     Vision loss     glasses     Past Surgical History:   Procedure Laterality Date    ADENOIDECTOMY       SECTION, CLASSIC      2    ORIF TIBIA & FIBULA FRACTURES      TONSILLECTOMY       Family History   Problem Relation Name Age of Onset    Diabetes Maternal Grandmother Jamshid Tyson     Heart disease Paternal Grandmother Eugenio An     Diabetes Paternal Grandmother Eugenio An     Heart failure Paternal Grandmother Eugenio An     Hypertension Paternal Grandmother Eugenio An     Rheum arthritis Paternal Grandmother Eugenio An     Brain cancer Paternal Grandfather Noah An     Cancer Paternal Grandfather Noah An     Asthma Mother Michelle Lopez     Migraines Mother Michelle Lopez      Social History     Tobacco Use    Smoking status: Every Day     Current packs/day: 0.25     Average packs/day: 0.3 packs/day for 10.0 years (2.5 ttl pk-yrs)     Types: Cigarettes     Passive  exposure: Current    Smokeless tobacco: Never   Substance Use Topics    Alcohol use: Never    Drug use: Yes     Frequency: 7.0 times per week     Types: Marijuana     Comment: medical marijuana       Physical Exam   ED Triage Vitals [07/26/24 0341]   Temperature Heart Rate Respirations BP   37.1 °C (98.7 °F) 76 16 (!) 170/98      Pulse Ox Temp Source Heart Rate Source Patient Position   95 % Temporal Monitor --      BP Location FiO2 (%)     Left arm --       Physical Exam  Vitals and nursing note reviewed.   Constitutional:       General: She is not in acute distress.     Appearance: She is well-developed.   HENT:      Head: Normocephalic and atraumatic.   Eyes:      Conjunctiva/sclera: Conjunctivae normal.   Cardiovascular:      Rate and Rhythm: Normal rate and regular rhythm.      Heart sounds: No murmur heard.  Pulmonary:      Effort: Pulmonary effort is normal. No respiratory distress.      Breath sounds: Normal breath sounds.   Abdominal:      Palpations: Abdomen is soft.      Tenderness: There is abdominal tenderness in the left lower quadrant.   Musculoskeletal:         General: No swelling.      Cervical back: Neck supple.   Skin:     General: Skin is warm and dry.      Capillary Refill: Capillary refill takes less than 2 seconds.   Neurological:      Mental Status: She is alert.   Psychiatric:         Mood and Affect: Mood normal.           ED Course & MDM   Diagnoses as of 07/26/24 0557   Ureteral stone                       [place] Coma Scale Score: 15                        Medical Decision Making  HISTORIAN:  Patient    CHART REVIEW:  No pertinent findings    PT SUMMARY:  39-year-old female presents ED with left lower abdominal pain.  Vital signs stable.    DDX:  Diverticulitis, hernia, ureteral stone, pyelonephritis, bowel obstruction, constipation      PLAN:  Will obtain a CBC, BMP, UA, CT abdomen pelvis, pregnancy test    DISPO/RE-EVAL:  Labs are unremarkable.  CT imaging shows a left-sided ureteral  stone.  Patient received send controlled on reevaluation patient symptoms are improved.  Since her labs are otherwise negative and symptoms are controlled we will discharge patient home.  Will prescribe her Flomax along with symptom management medications.  Will refer her to urology.  Advised to come back to ED for any new or worsening symptoms.          Procedure  Procedures     Albert Rowe DO  07/26/24 0525

## 2024-08-25 ENCOUNTER — HOSPITAL ENCOUNTER (EMERGENCY)
Facility: HOSPITAL | Age: 39
Discharge: HOME | End: 2024-08-25
Payer: COMMERCIAL

## 2024-08-25 VITALS
SYSTOLIC BLOOD PRESSURE: 142 MMHG | WEIGHT: 258 LBS | HEIGHT: 62 IN | OXYGEN SATURATION: 98 % | RESPIRATION RATE: 16 BRPM | DIASTOLIC BLOOD PRESSURE: 92 MMHG | TEMPERATURE: 98.4 F | BODY MASS INDEX: 47.48 KG/M2 | HEART RATE: 80 BPM

## 2024-08-25 DIAGNOSIS — L03.317 CELLULITIS OF BUTTOCK: Primary | ICD-10-CM

## 2024-08-25 PROCEDURE — 2500000001 HC RX 250 WO HCPCS SELF ADMINISTERED DRUGS (ALT 637 FOR MEDICARE OP): Performed by: NURSE PRACTITIONER

## 2024-08-25 PROCEDURE — 2500000002 HC RX 250 W HCPCS SELF ADMINISTERED DRUGS (ALT 637 FOR MEDICARE OP, ALT 636 FOR OP/ED): Performed by: NURSE PRACTITIONER

## 2024-08-25 PROCEDURE — 99283 EMERGENCY DEPT VISIT LOW MDM: CPT

## 2024-08-25 RX ORDER — CEPHALEXIN 500 MG/1
500 CAPSULE ORAL 4 TIMES DAILY
Qty: 28 CAPSULE | Refills: 0 | Status: SHIPPED | OUTPATIENT
Start: 2024-08-25 | End: 2024-09-01

## 2024-08-25 RX ORDER — SULFAMETHOXAZOLE AND TRIMETHOPRIM 800; 160 MG/1; MG/1
1 TABLET ORAL ONCE
Status: COMPLETED | OUTPATIENT
Start: 2024-08-25 | End: 2024-08-25

## 2024-08-25 RX ORDER — CEPHALEXIN 250 MG/1
500 CAPSULE ORAL ONCE
Status: COMPLETED | OUTPATIENT
Start: 2024-08-25 | End: 2024-08-25

## 2024-08-25 RX ORDER — SULFAMETHOXAZOLE AND TRIMETHOPRIM 800; 160 MG/1; MG/1
1 TABLET ORAL 2 TIMES DAILY
Qty: 14 TABLET | Refills: 0 | Status: SHIPPED | OUTPATIENT
Start: 2024-08-25 | End: 2024-09-01

## 2024-08-26 NOTE — ED PROVIDER NOTES
HPI   Chief Complaint   Patient presents with    Abscess     Near tailbone, pt states it has been draining. Noticed 3-4 days ago       39-year-old female with history of bipolar disorder presents to the emergency department today complaining of a right buttock abscess.  Patient states about 3 days ago she began having send pain in the right buttock slightly of the tailbone.  There is swelling in the region.  States she began having some drainage from the area and the swelling improved.  She had a friend look at her buttock and they noticed some redness so she went to be evaluated.  No fever or chills.  No pain with bowel movements.  Denies any abdominal pain or lightheadedness.  Feeling well overall otherwise.  No history of abscesses in the past or pilonidal cyst.                          [place] Coma Scale Score: 15                  Patient History   Past Medical History:   Diagnosis Date    Allergic rhinitis     Anxiety     Arthritis     Asthma (HHS-HCC)     childhood    Bipolar disorder (Multi)     Cyst of nasal cavity     right    Dental disease     Depression     Headache     HL (hearing loss)     Hypoglycemia     Migraine     Nasal polyps     Nephrolithiasis     Nosebleed     Obesity     PTSD (post-traumatic stress disorder)     Sinusitis     Sleep apnea     CPAP-noncompliant    Sleep difficulties     TIA (transient ischemic attack)      or     Vision loss     glasses     Past Surgical History:   Procedure Laterality Date    ADENOIDECTOMY       SECTION, CLASSIC      2    ORIF TIBIA & FIBULA FRACTURES      TONSILLECTOMY       Family History   Problem Relation Name Age of Onset    Diabetes Maternal Grandmother Jamshid Tyson     Heart disease Paternal Grandmother Eugenio An     Diabetes Paternal Grandmother Eugenio An     Heart failure Paternal Grandmother Eugenio An     Hypertension Paternal Grandmother Eugenio An     Rheum arthritis Paternal Grandmother Eugenio  Juve     Brain cancer Paternal Grandfather Noah An     Cancer Paternal Grandfather Noah An     Asthma Mother Michelle Lopez     Migraines Mother Michelle Lopez      Social History     Tobacco Use    Smoking status: Every Day     Current packs/day: 0.25     Average packs/day: 0.3 packs/day for 10.0 years (2.5 ttl pk-yrs)     Types: Cigarettes     Passive exposure: Current    Smokeless tobacco: Never   Substance Use Topics    Alcohol use: Never    Drug use: Yes     Frequency: 7.0 times per week     Types: Marijuana     Comment: medical marijuana       Physical Exam   ED Triage Vitals [08/25/24 2123]   Temperature Heart Rate Respirations BP   36.9 °C (98.4 °F) 80 16 (!) 142/92      Pulse Ox Temp Source Heart Rate Source Patient Position   98 % Tympanic -- --      BP Location FiO2 (%)     -- --       Physical Exam  Vitals reviewed.   Constitutional:       Appearance: Normal appearance.   HENT:      Head: Normocephalic.   Cardiovascular:      Rate and Rhythm: Normal rate and regular rhythm.   Pulmonary:      Effort: Pulmonary effort is normal.      Breath sounds: Normal breath sounds.   Abdominal:      General: Abdomen is flat.      Palpations: Abdomen is soft.   Skin:     General: Skin is warm and dry.      Capillary Refill: Capillary refill takes less than 2 seconds.      Comments: Right buttock does have a region of erythema and near the gluteal cleft there is a small opening that has serosanguineous drainage.  No abscess is palpated at this time.  No lymphangitic streaking.  It is not near the pilonidal region nor the area of the anus.   Neurological:      Mental Status: She is alert.         Labs Reviewed - No data to display  Pain Management Panel           No data to display              No orders to display       ED Course & MDM   Diagnoses as of 08/25/24 2234   Cellulitis of buttock       Medical Decision Making  On initial evaluation patient is well-appearing emergency department at this  time.  She does have an area of cellulitis which was circled with a skin marker.  A bedside ultrasound was performed by dr bo.  There is some cobblestoning however we do not appreciate an abscess that needs any further drain at this time.  It is actively draining.  A gauze was applied.  Patient was given Bactrim and Keflex in the ER and discharged home with a prescription x 7 days.  Educated on wound care as well as return precautions and outpatient follow-up and strict return precautions.  She verbalized understanding of the plan has no further questions or concerns patient will be discharged home in stable condition.        Procedure  Procedures          I discussed the differential, results and discharge plan with the patient and/or family/friend/caregiver if present.  I emphasized the importance of follow-up with the physician I referred them to in the timeframe recommended.  I explained reasons for the patient to return to the Emergency Department. Additional verbal discharge instructions were also given and discussed with the patient to supplement those generated by the EMR. We also discussed medications that were prescribed (if any) including common side effects and interactions. The patient was advised to abstain from driving, operating heavy machinery or making significant decisions while taking medications such as opiates and muscle relaxers that may impair this. All questions were addressed.  They understand return precautions and discharge instructions. The patient and/or family/friend/caregiver expressed understanding.           Vane Herrera, LESVIA-AHMET  08/25/24 5379

## 2024-09-02 ENCOUNTER — HOSPITAL ENCOUNTER (EMERGENCY)
Facility: HOSPITAL | Age: 39
Discharge: HOME | End: 2024-09-03
Payer: COMMERCIAL

## 2024-09-02 VITALS
RESPIRATION RATE: 16 BRPM | HEART RATE: 65 BPM | WEIGHT: 280 LBS | TEMPERATURE: 98.1 F | OXYGEN SATURATION: 100 % | DIASTOLIC BLOOD PRESSURE: 106 MMHG | SYSTOLIC BLOOD PRESSURE: 162 MMHG | HEIGHT: 62 IN | BODY MASS INDEX: 51.53 KG/M2

## 2024-09-02 DIAGNOSIS — K02.9 PAIN DUE TO DENTAL CARIES: Primary | ICD-10-CM

## 2024-09-02 PROCEDURE — 99283 EMERGENCY DEPT VISIT LOW MDM: CPT

## 2024-09-02 RX ORDER — NAPROXEN 250 MG/1
500 TABLET ORAL ONCE
Status: COMPLETED | OUTPATIENT
Start: 2024-09-02 | End: 2024-09-03

## 2024-09-02 RX ORDER — CLINDAMYCIN HYDROCHLORIDE 150 MG/1
450 CAPSULE ORAL ONCE
Status: COMPLETED | OUTPATIENT
Start: 2024-09-02 | End: 2024-09-03

## 2024-09-02 RX ORDER — CLINDAMYCIN HYDROCHLORIDE 150 MG/1
450 CAPSULE ORAL 3 TIMES DAILY
Qty: 63 CAPSULE | Refills: 0 | Status: SHIPPED | OUTPATIENT
Start: 2024-09-02 | End: 2024-09-09

## 2024-09-02 RX ORDER — NAPROXEN 500 MG/1
500 TABLET ORAL 2 TIMES DAILY PRN
Qty: 14 TABLET | Refills: 0 | Status: SHIPPED | OUTPATIENT
Start: 2024-09-02 | End: 2024-09-09

## 2024-09-02 ASSESSMENT — COLUMBIA-SUICIDE SEVERITY RATING SCALE - C-SSRS
2. HAVE YOU ACTUALLY HAD ANY THOUGHTS OF KILLING YOURSELF?: NO
6. HAVE YOU EVER DONE ANYTHING, STARTED TO DO ANYTHING, OR PREPARED TO DO ANYTHING TO END YOUR LIFE?: NO
1. IN THE PAST MONTH, HAVE YOU WISHED YOU WERE DEAD OR WISHED YOU COULD GO TO SLEEP AND NOT WAKE UP?: NO

## 2024-09-02 NOTE — Clinical Note
Shahla An was seen and treated in our emergency department on 9/2/2024.  She may return to work on 09/03/2024.       If you have any questions or concerns, please don't hesitate to call.      Vane Herrera, APRN-CNP

## 2024-09-03 PROCEDURE — 2500000001 HC RX 250 WO HCPCS SELF ADMINISTERED DRUGS (ALT 637 FOR MEDICARE OP): Performed by: NURSE PRACTITIONER

## 2024-09-03 NOTE — ED PROVIDER NOTES
HPI   Chief Complaint   Patient presents with    Dental Pain       39-year-old female presents to the emergency department today for dental pain.  Patient states that 2 days ago began having lower dental discomfort.  States that she saw her dentist and he is aware that she needs to have a root canal however wanted her to placed on antibiotics.  She has a root canal scheduled is not until November.  There is been no fever or chills.  No neck pain or stiffness.  She has been taking acetaminophen at home with slight improvement in the pain.                          Angelina Coma Scale Score: 15                  Patient History   Past Medical History:   Diagnosis Date    Allergic rhinitis     Anxiety     Arthritis     Asthma (HHS-HCC)     childhood    Bipolar disorder (Multi)     Cyst of nasal cavity     right    Dental disease     Depression     Headache     HL (hearing loss)     Hypoglycemia     Migraine     Nasal polyps     Nephrolithiasis     Nosebleed     Obesity     PTSD (post-traumatic stress disorder)     Sinusitis     Sleep apnea     CPAP-noncompliant    Sleep difficulties     TIA (transient ischemic attack)      or     Vision loss     glasses     Past Surgical History:   Procedure Laterality Date    ADENOIDECTOMY       SECTION, CLASSIC      2    ORIF TIBIA & FIBULA FRACTURES      TONSILLECTOMY       Family History   Problem Relation Name Age of Onset    Diabetes Maternal Grandmother Jamshid Tyson     Heart disease Paternal Grandmother Eugenio An     Diabetes Paternal Grandmother Eugenio An     Heart failure Paternal Grandmother Eugenio An     Hypertension Paternal Grandmother Eugenio An     Rheum arthritis Paternal Grandmother Eugenio An     Brain cancer Paternal Grandfather Noah An     Cancer Paternal Grandfather Noah An     Asthma Mother Michelle Lopez     Migraines Mother Michelle Lopez      Social History     Tobacco Use    Smoking  status: Every Day     Current packs/day: 0.25     Average packs/day: 0.3 packs/day for 10.0 years (2.5 ttl pk-yrs)     Types: Cigarettes     Passive exposure: Current    Smokeless tobacco: Never   Substance Use Topics    Alcohol use: Never    Drug use: Yes     Frequency: 7.0 times per week     Types: Marijuana     Comment: medical marijuana       Physical Exam   ED Triage Vitals [09/02/24 2302]   Temperature Heart Rate Respirations BP   36.7 °C (98.1 °F) 65 16 (!) 162/106      Pulse Ox Temp Source Heart Rate Source Patient Position   100 % Temporal -- --      BP Location FiO2 (%)     -- --       Physical Exam  Vitals reviewed.   Constitutional:       Appearance: Normal appearance.   HENT:      Head: Normocephalic.      Mouth/Throat:      Lips: Pink.      Mouth: Mucous membranes are moist.      Dentition: Abnormal dentition. Dental tenderness and dental caries present.      Tongue: No lesions.     Cardiovascular:      Rate and Rhythm: Normal rate and regular rhythm.   Pulmonary:      Effort: Pulmonary effort is normal.      Breath sounds: Normal breath sounds.   Abdominal:      General: Abdomen is flat.      Palpations: Abdomen is soft.   Skin:     General: Skin is warm and dry.      Capillary Refill: Capillary refill takes less than 2 seconds.   Neurological:      Mental Status: She is alert.         Labs Reviewed - No data to display  Pain Management Panel           No data to display              No orders to display       ED Course & MDM   Diagnoses as of 09/02/24 2348   Pain due to dental caries       Medical Decision Making  On initial evaluation patient is well-appearing the emergency department at this time.  Patient is found to have erythema and gingival swelling at the lower jaw there is no obvious swelling otherwise.  No obvious abscess that is able to be drained at this time.  No neck pain or stiffness.  Clindamycin and naproxen were ordered for the patient with pain and antibiotics for any likely  periapical infection.  I sent these prescriptions to her pharmacist pharmacy as well.  Patient verbalized understanding of the plan has no further questions or concerns will be discharged home in stable condition with close outpatient follow-up and strict return precautions.        Procedure  Procedures        I discussed the differential, results and discharge plan with the patient and/or family/friend/caregiver if present.  I emphasized the importance of follow-up with the physician I referred them to in the timeframe recommended.  I explained reasons for the patient to return to the Emergency Department. Additional verbal discharge instructions were also given and discussed with the patient to supplement those generated by the EMR. We also discussed medications that were prescribed (if any) including common side effects and interactions. The patient was advised to abstain from driving, operating heavy machinery or making significant decisions while taking medications such as opiates and muscle relaxers that may impair this. All questions were addressed.  They understand return precautions and discharge instructions. The patient and/or family/friend/caregiver expressed understanding.           Vane Herrera, LESVIA-AHMET  09/02/24 3061

## 2024-10-01 ENCOUNTER — APPOINTMENT (OUTPATIENT)
Dept: UROLOGY | Facility: CLINIC | Age: 39
End: 2024-10-01
Payer: COMMERCIAL

## 2024-12-20 ENCOUNTER — HOSPITAL ENCOUNTER (EMERGENCY)
Facility: HOSPITAL | Age: 39
Discharge: HOME | End: 2024-12-20
Attending: STUDENT IN AN ORGANIZED HEALTH CARE EDUCATION/TRAINING PROGRAM
Payer: COMMERCIAL

## 2024-12-20 VITALS
SYSTOLIC BLOOD PRESSURE: 142 MMHG | RESPIRATION RATE: 18 BRPM | OXYGEN SATURATION: 100 % | HEIGHT: 62 IN | TEMPERATURE: 97 F | BODY MASS INDEX: 46.01 KG/M2 | HEART RATE: 79 BPM | WEIGHT: 250 LBS | DIASTOLIC BLOOD PRESSURE: 87 MMHG
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
## 2024-12-20 DIAGNOSIS — T14.8XXA ABRASION: ICD-10-CM

## 2024-12-20 PROCEDURE — 99283 EMERGENCY DEPT VISIT LOW MDM: CPT | Performed by: STUDENT IN AN ORGANIZED HEALTH CARE EDUCATION/TRAINING PROGRAM

## 2024-12-20 PROCEDURE — 2500000001 HC RX 250 WO HCPCS SELF ADMINISTERED DRUGS (ALT 637 FOR MEDICARE OP): Performed by: STUDENT IN AN ORGANIZED HEALTH CARE EDUCATION/TRAINING PROGRAM

## 2024-12-20 PROCEDURE — 2500000005 HC RX 250 GENERAL PHARMACY W/O HCPCS: Performed by: STUDENT IN AN ORGANIZED HEALTH CARE EDUCATION/TRAINING PROGRAM

## 2024-12-20 RX ORDER — ACETAMINOPHEN 325 MG/1
975 TABLET ORAL ONCE
Status: COMPLETED | OUTPATIENT
Start: 2024-12-20 | End: 2024-12-20

## 2024-12-20 RX ORDER — BACITRACIN ZINC 500 UNIT/G
OINTMENT IN PACKET (EA) TOPICAL ONCE
Status: COMPLETED | OUTPATIENT
Start: 2024-12-20 | End: 2024-12-20

## 2024-12-20 ASSESSMENT — PAIN DESCRIPTION - LOCATION: LOCATION: ARM

## 2024-12-20 ASSESSMENT — PAIN SCALES - GENERAL: PAINLEVEL_OUTOF10: 6

## 2024-12-20 ASSESSMENT — PAIN - FUNCTIONAL ASSESSMENT: PAIN_FUNCTIONAL_ASSESSMENT: 0-10

## 2024-12-20 ASSESSMENT — PAIN DESCRIPTION - ORIENTATION: ORIENTATION: LEFT

## 2024-12-20 NOTE — ED TRIAGE NOTES
Pt to ED with c/o mechanical fall injuring her L arm on a arturo toolbox. Pt tdap not up to date.

## 2024-12-20 NOTE — ED PROVIDER NOTES
HPI   Chief Complaint   Patient presents with    Fall       HPI: Patient is a 39-year-old female otherwise healthy who is presenting to the emergency department for concern for a fall.  Patient fell into her 's arturo toolbox, cut her left upper extremity.  She is having some pain and burning around the site.  She denies hitting her head or losing consciousness.  She denies any other injuries.      ROS: Complete review of systems performed, otherwise negative except as noted in the history of present illness    PMH: Reviewed, documented below in note. Pertinents in HPI  PSH: Reviewed and documented below in note. Pertinents in HPI  SH: No illicits.  Not homeless.  Fam: Reviewed, noncontributory to patients current complaint  MEDS: Reviewed and documented below in note. Pertinents in HPI  ALLERGIES: Reviewed and documented below in note.        History provided by:  Patient   used: No                          Many Coma Scale Score: 15                  Patient History   Past Medical History:   Diagnosis Date    Allergic rhinitis     Anxiety     Arthritis     Asthma     childhood    Bipolar disorder     Cyst of nasal cavity     right    Dental disease     Depression     Headache     HL (hearing loss)     Hypoglycemia     Migraine     Nasal polyps     Nephrolithiasis     Nosebleed     Obesity     PTSD (post-traumatic stress disorder)     Sinusitis     Sleep apnea     CPAP-noncompliant    Sleep difficulties     TIA (transient ischemic attack)      or     Vision loss     glasses     Past Surgical History:   Procedure Laterality Date    ADENOIDECTOMY       SECTION, CLASSIC      2    ORIF TIBIA & FIBULA FRACTURES      TONSILLECTOMY       Family History   Problem Relation Name Age of Onset    Diabetes Maternal Grandmother Jamshid Tyson     Heart disease Paternal Grandmother Eugenio An     Diabetes Paternal Grandmother Eugenio An     Heart failure Paternal  "Grandmother Eugenio An     Hypertension Paternal Grandmother Eugenio An     Rheum arthritis Paternal Grandmother Eugenio An     Brain cancer Paternal Grandfather Noah An     Cancer Paternal Grandfather Noah An     Asthma Mother Michelle Lopez     Migraines Mother Michelle Lopez      Social History     Tobacco Use    Smoking status: Every Day     Current packs/day: 0.25     Average packs/day: 0.3 packs/day for 10.0 years (2.5 ttl pk-yrs)     Types: Cigarettes     Passive exposure: Current    Smokeless tobacco: Never   Vaping Use    Vaping status: Never Used   Substance Use Topics    Alcohol use: Never    Drug use: Yes     Frequency: 7.0 times per week     Types: Marijuana     Comment: medical marijuana       Physical Exam   Visit Vitals  /87   Pulse 79   Temp 36.1 °C (97 °F) (Temporal)   Resp 18   Ht 1.575 m (5' 2\")   Wt 113 kg (250 lb)   LMP  (LMP Unknown) Comment: irreg   SpO2 100%   BMI 45.73 kg/m²   OB Status Having periods   Smoking Status Every Day   BSA 2.22 m²      Physical Exam  Vitals and nursing note reviewed.   Constitutional:       Appearance: Normal appearance.   HENT:      Head: Normocephalic and atraumatic.   Neck:      Vascular: No carotid bruit.   Cardiovascular:      Rate and Rhythm: Normal rate and regular rhythm.      Pulses: Normal pulses.      Heart sounds: Normal heart sounds.   Pulmonary:      Effort: Pulmonary effort is normal.      Breath sounds: Normal breath sounds.   Musculoskeletal:         General: Swelling, tenderness and signs of injury present. No deformity. Normal range of motion.      Cervical back: Normal range of motion. No rigidity.   Skin:     General: Skin is warm and dry.      Capillary Refill: Capillary refill takes less than 2 seconds.      Comments: Patient has a abrasion to the left upper extremity, partially below the elbow to the forearm and partially above the elbow.  No active bleeding.  There is some underlying hematoma " noted.  She has full range of motion of her shoulder elbow and wrist.  She is neurovascularly intact distally.   Neurological:      General: No focal deficit present.      Mental Status: She is alert and oriented to person, place, and time.      Sensory: No sensory deficit.      Motor: No weakness.   Psychiatric:         Mood and Affect: Mood normal.         Behavior: Behavior normal.         No orders to display       Labs Reviewed - No data to display      ED Course & MDM   Diagnoses as of 12/20/24 0915   Fall, initial encounter   Abrasion           Medical Decision Making  All mentioned lab results, ECGs, and imaging were independently reviewed by myself  - Patient evaluated. Patient is presenting to the emergency department after a fall.  She has a left upper extremity abrasion.  She has full range of motion with no underlying deformity.  My suspicion for an underlying fracture or dislocation is low and while x-ray imaging was considered I do not believe is clinically indicated.  She was able to confirm that she has had a tetanus shot within the last 5 years and therefore this was not required for updating.  Wound was cleaned with Shur-Clens, bacitracin was applied and gauze wrapping was performed.  This does not require laceration repair with sutures, staples, glue, or Steri-Strips.  Tylenol and ibuprofen were given for symptoms.  The patient was discharged otherwise stable condition with wound care instructions.  - Monitored for any changes in stability or symptomatology. Patient remained stable.   - Counseled regarding labs, imaging, diagnosis, and plan. Patient was agreeable. All questions were answered. The patient was receptive and agreeable to the plan of care.   -The patient was instructed to return to the emergency department if any symptoms recurred, worsened, or if there were any additional concerns.    *Disclaimer: This note was dictated by speech recognition. Minor errors in transcription may be  present. Please call with questions.    Karson Vines MD             Your medication list        ASK your doctor about these medications        Instructions Last Dose Given Next Dose Due   budesonide 0.5 mg/2 mL nebulizer solution  Commonly known as: Pulmicort      Take 2 mL (0.5 mg) by nebulization 2 times a day. Rinse mouth with water after use to reduce aftertaste and incidence of candidiasis. Do not swallow.       budesonide (bulk) powder      0.6 mg 2 times a day. Compound 0.6 mg capsule to be added to sinus rinse twice daily.       cyclobenzaprine 5 mg tablet  Commonly known as: Flexeril           fluticasone 50 mcg/actuation nasal spray  Commonly known as: Flonase            mg tablet  Generic drug: ibuprofen           meloxicam 15 mg tablet  Commonly known as: Mobic           oxymetazoline 0.05 % nasal spray  Commonly known as: Afrin      Administer 2 sprays into each nostril every 12 hours if needed (nose bleeds) for up to 3 days. Do not use for more than 3 days.       predniSONE 10 mg tablet  Commonly known as: Deltasone      40mg PO for 3 days, 30mg PO Qday for 3 days, 20mg PO Qday for 3 days, 10 mg PO Qday for 3 days, then stop.       tamsulosin 0.4 mg 24 hr capsule  Commonly known as: Flomax      Take 1 capsule (0.4 mg) by mouth once daily.                Procedure  Procedures     *This report was transcribed using voice recognition software.  Every effort was made to ensure accuracy; however, inadvertent computerized transcription errors may be present.*  Sam Vines MD  12/20/24         Sam Vines MD  12/20/24 2786

## (undated) DEVICE — TOWEL, SURGICAL, NEURO, O/R, 16 X 26, BLUE, STERILE

## (undated) DEVICE — TUBE, SALEM SUMP, 16 FR X 48IN, ENFIT

## (undated) DEVICE — COVER, CART, 45 X 27 X 48 IN, CLEAR

## (undated) DEVICE — REST, HEAD, BAGEL, 9 IN

## (undated) DEVICE — TRAP, SPECIMEN, NEPTUNE QUICK COLLECTOR

## (undated) DEVICE — BLADE, FUSION 4.3 X 13 ROTATABLE

## (undated) DEVICE — Device

## (undated) DEVICE — DRAPE, FLUID WARMER

## (undated) DEVICE — DRESSING, HEMOPORE, 8CM

## (undated) DEVICE — BOWL, BASIN, 32 OZ, STERILE

## (undated) DEVICE — BLADE, INFERIOR TURBINATE

## (undated) DEVICE — BLADE, 60 DEGREE, 2.9MM, MICRODEBRIDER

## (undated) DEVICE — MANIFOLD, 4 PORT NEPTUNE STANDARD